# Patient Record
Sex: MALE | Race: WHITE | NOT HISPANIC OR LATINO | Employment: FULL TIME | URBAN - METROPOLITAN AREA
[De-identification: names, ages, dates, MRNs, and addresses within clinical notes are randomized per-mention and may not be internally consistent; named-entity substitution may affect disease eponyms.]

---

## 2023-09-08 PROBLEM — E78.00 HYPERCHOLESTEROLEMIA: Status: ACTIVE | Noted: 2023-09-08

## 2023-09-08 PROBLEM — I10 ESSENTIAL HYPERTENSION: Status: ACTIVE | Noted: 2023-09-08

## 2023-09-08 PROBLEM — H33.20 RETINAL DETACHMENT: Status: ACTIVE | Noted: 2023-09-08

## 2023-09-08 PROBLEM — Z99.2 DEPENDENCE ON RENAL DIALYSIS (CMS-HCC): Status: ACTIVE | Noted: 2023-09-08

## 2023-09-08 PROBLEM — E11.9 DIABETES MELLITUS (MULTI): Status: ACTIVE | Noted: 2023-09-08

## 2023-09-08 PROBLEM — R06.00 DYSPNEA: Status: ACTIVE | Noted: 2023-09-08

## 2023-09-08 PROBLEM — N18.6 END STAGE RENAL DISEASE (MULTI): Status: ACTIVE | Noted: 2023-09-08

## 2023-09-08 PROBLEM — I25.10 ATHEROSCLEROTIC HEART DISEASE OF NATIVE CORONARY ARTERY WITHOUT ANGINA PECTORIS: Status: ACTIVE | Noted: 2023-09-08

## 2023-09-08 PROBLEM — E11.3593 PROLIFERATIVE DIABETIC RETINOPATHY OF BOTH EYES ASSOCIATED WITH TYPE 2 DIABETES MELLITUS (MULTI): Status: ACTIVE | Noted: 2023-09-08

## 2023-09-08 PROBLEM — R73.09 BLOOD GLUCOSE ABNORMAL: Status: ACTIVE | Noted: 2023-09-08

## 2023-09-08 PROBLEM — I50.9 CONGESTIVE HEART FAILURE (MULTI): Status: ACTIVE | Noted: 2023-09-08

## 2023-09-08 PROBLEM — N19 RENAL FAILURE: Status: ACTIVE | Noted: 2023-09-08

## 2023-09-08 PROBLEM — M54.12 CERVICAL RADICULOPATHY: Status: ACTIVE | Noted: 2023-09-08

## 2023-09-08 PROBLEM — D64.9 ANEMIA: Status: ACTIVE | Noted: 2023-09-08

## 2023-09-08 PROBLEM — E11.9 TYPE 2 DIABETES MELLITUS WITHOUT COMPLICATION (MULTI): Status: ACTIVE | Noted: 2023-09-08

## 2023-09-08 PROBLEM — A41.9 SEPSIS (MULTI): Status: ACTIVE | Noted: 2023-09-08

## 2023-09-08 PROBLEM — E11.40 DIABETIC NEUROPATHY (MULTI): Status: ACTIVE | Noted: 2023-09-08

## 2023-09-08 PROBLEM — E86.0 DEHYDRATION: Status: ACTIVE | Noted: 2023-09-08

## 2023-09-08 RX ORDER — CARVEDILOL 25 MG/1
1 TABLET ORAL
COMMUNITY

## 2023-09-08 RX ORDER — ASPIRIN 81 MG/1
1 TABLET ORAL DAILY
COMMUNITY
End: 2024-02-06

## 2023-09-08 RX ORDER — FLUTICASONE PROPIONATE 50 MCG
1 SPRAY, SUSPENSION (ML) NASAL DAILY
COMMUNITY
Start: 2023-01-27 | End: 2024-02-06

## 2023-09-08 RX ORDER — ATORVASTATIN CALCIUM 20 MG/1
1 TABLET, FILM COATED ORAL DAILY
COMMUNITY
End: 2024-02-06

## 2023-09-08 RX ORDER — SAXAGLIPTIN AND METFORMIN HYDROCHLORIDE 1000; 2.5 MG/1; MG/1
1 TABLET, FILM COATED, EXTENDED RELEASE ORAL EVERY 12 HOURS
COMMUNITY
Start: 2013-07-12 | End: 2024-02-06

## 2023-09-08 RX ORDER — AMLODIPINE BESYLATE 10 MG/1
1 TABLET ORAL DAILY
COMMUNITY

## 2023-09-08 RX ORDER — LANCETS 33 GAUGE
EACH MISCELLANEOUS EVERY 6 HOURS
COMMUNITY
Start: 2021-06-17 | End: 2024-02-06

## 2023-09-08 RX ORDER — HYDRALAZINE HYDROCHLORIDE 100 MG/1
1 TABLET, FILM COATED ORAL 3 TIMES DAILY
COMMUNITY

## 2023-09-08 RX ORDER — INSULIN DETEMIR 100 [IU]/ML
40 INJECTION, SOLUTION SUBCUTANEOUS
COMMUNITY
Start: 2014-10-13 | End: 2024-02-06

## 2023-09-08 RX ORDER — LISINOPRIL 10 MG/1
1 TABLET ORAL DAILY
COMMUNITY
Start: 2013-11-14 | End: 2024-02-06

## 2023-09-08 RX ORDER — LOSARTAN POTASSIUM 100 MG/1
1 TABLET ORAL DAILY
COMMUNITY

## 2023-09-08 RX ORDER — DICLOFENAC SODIUM 10 MG/G
1 GEL TOPICAL EVERY 6 HOURS
COMMUNITY
Start: 2023-03-16 | End: 2024-02-06

## 2023-10-05 ENCOUNTER — TREATMENT (OUTPATIENT)
Dept: PHYSICAL THERAPY | Facility: CLINIC | Age: 53
End: 2023-10-05
Payer: COMMERCIAL

## 2023-10-05 DIAGNOSIS — G95.9 DISEASE OF SPINAL CORD (MULTI): Primary | ICD-10-CM

## 2023-10-05 DIAGNOSIS — G95.9 DISEASE OF SPINAL CORD, UNSPECIFIED (MULTI): ICD-10-CM

## 2023-10-05 PROCEDURE — 97530 THERAPEUTIC ACTIVITIES: CPT | Mod: GP | Performed by: PHYSICAL MEDICINE & REHABILITATION

## 2023-10-05 PROCEDURE — 97110 THERAPEUTIC EXERCISES: CPT | Mod: GP | Performed by: PHYSICAL MEDICINE & REHABILITATION

## 2023-10-05 ASSESSMENT — PAIN SCALES - GENERAL: PAINLEVEL_OUTOF10: 0 - NO PAIN

## 2023-10-05 NOTE — PROGRESS NOTES
Physical Therapy Treatment    Patient Name: Abner Fabian  MRN: [id number]  Today's Date: 10/5/2023     Current Problem  1. Disease of spinal cord (CMS/HCC)        2. Disease of spinal cord, unspecified (CMS/HCC)  Follow Up In Physical Therapy          Subjective   General  General Comment: Patient reports good progress since initiating physical therapy. He reports no new complaints this visit,and feels ready to begin working on his HEP after today.  Precautions  Precautions  Precautions Comment: None  Pain  Pain Score: 0 - No pain    Objective     Outcome Measures:  Other Measures  Neck Disability Index: (P) 2% (Improved from 10%)    Treatments:  Therapeutic Exercise  Therapeutic Exercise Activity 1: SciFit: L3 x3' fwd and back  Therapeutic Exercise Activity 2: Pec Stretch Low/High x1' each  Therapeutic Exercise Activity 3: Therabar twists x20 fwd and back  Therapeutic Exercise Activity 4: Forearm pronation x20 w towel  and 5#  Therapeutic Exercise Activity 5: Forearm supination x20 w towel  and 5#  Therapeutic Exercise Activity 6: L wrist extension: x20 w 8#  Therapeutic Exercise Activity 7: L wrist flexion:x20 w 8#  Therapeutic Exercise Activity 8: Seated Rows at Matrix: 3x10 w 25#  Therapeutic Exercise Activity 9: Shoulder Ext: 3x10 w 17.5#  Therapeutic Exercise Activity 10: Standing ER: 3x10 w 5#  Therapeutic Exercise Activity 11: Ball on the wall walks: x20 into flexion  Therapeutic Exercise Activity 12: Chest Flyes w cervical stabilization: x20 w 5#  Therapeutic Exercise Activity 13: Supine shoulder flexion x20 w 9# bar  Therapeutic Exercise Activity 14: Supine SA Punch: x20 w 5#    Therapeutic Activity  Therapeutic Activity 1: Shelf Taps: x20 w 8# med ball  Therapeutic Activity 2: Farmer Walks: 4x50' down and back w towel  and 5# KBs    Assessment:  PT Assessment  Assessment Comment: Patient demonstrates great progress since initiating physical therapy. He has met his therapy related goals, and  has elected to be discharged following today's session.    Plan:  OP PT Plan  PT Plan: No Additional PT interventions required at this time    Goals:   1. Patient will achieve an NDI score of 5% or less in order to improve overall quality of life in 8 weeks (Goal Met)  2. Patient to display 80 degrees of bilateral cervical rotation AROM in order to allow for improved driving ability in 6 weeks (Not Met)  3. Patient to improve scapular strength to 5-/5 in order to reduce compensatory guarding in upper trapezius and greater functional use of upper extremities in 8 weeks. (Goal Met)  4. Patient to be able to perform all daily/work related tasks with pain no greater than 2/10 in 5 weeks (Goal Met)  5. Patient will be independent with HEP in 3 visits in order to allow for improved function with home and community tasks. (Goal Met)     Darian Jacques, PT

## 2023-11-02 ENCOUNTER — OFFICE VISIT (OUTPATIENT)
Dept: OPHTHALMOLOGY | Facility: CLINIC | Age: 53
End: 2023-11-02
Payer: MEDICARE

## 2023-11-02 DIAGNOSIS — H49.01 CN III PALSY, RIGHT: ICD-10-CM

## 2023-11-02 DIAGNOSIS — H05.89 ORBITAL MASS: Primary | ICD-10-CM

## 2023-11-02 PROCEDURE — 92083 EXTENDED VISUAL FIELD XM: CPT | Performed by: PSYCHIATRY & NEUROLOGY

## 2023-11-02 PROCEDURE — 92060 SENSORIMOTOR EXAMINATION: CPT | Performed by: PSYCHIATRY & NEUROLOGY

## 2023-11-02 PROCEDURE — 99215 OFFICE O/P EST HI 40 MIN: CPT | Performed by: PSYCHIATRY & NEUROLOGY

## 2023-11-02 PROCEDURE — 92133 CPTRZD OPH DX IMG PST SGM ON: CPT | Performed by: PSYCHIATRY & NEUROLOGY

## 2023-11-02 ASSESSMENT — ENCOUNTER SYMPTOMS
ALLERGIC/IMMUNOLOGIC NEGATIVE: 0
HEMATOLOGIC/LYMPHATIC NEGATIVE: 0
RESPIRATORY NEGATIVE: 0
CONSTITUTIONAL NEGATIVE: 0
CARDIOVASCULAR NEGATIVE: 0
MUSCULOSKELETAL NEGATIVE: 0
NEUROLOGICAL NEGATIVE: 0
GASTROINTESTINAL NEGATIVE: 0
ENDOCRINE NEGATIVE: 0
EYES NEGATIVE: 1
PSYCHIATRIC NEGATIVE: 0

## 2023-11-02 ASSESSMENT — CONF VISUAL FIELD
OS_NORMAL: 1
OD_INFERIOR_NASAL_RESTRICTION: 0
OS_INFERIOR_TEMPORAL_RESTRICTION: 0
OD_NORMAL: 1
OS_SUPERIOR_NASAL_RESTRICTION: 0
METHOD: COUNTING FINGERS
OD_SUPERIOR_NASAL_RESTRICTION: 0
OS_SUPERIOR_TEMPORAL_RESTRICTION: 0
OS_INFERIOR_NASAL_RESTRICTION: 0
OD_INFERIOR_TEMPORAL_RESTRICTION: 0
OD_SUPERIOR_TEMPORAL_RESTRICTION: 0

## 2023-11-02 ASSESSMENT — VISUAL ACUITY
OS_SC: 20/70-1
OS_PH_SC: 20/60-2
OD_PH_SC: 20/20-3
OD_SC: 20/30-2
METHOD: SNELLEN - LINEAR

## 2023-11-02 ASSESSMENT — CUP TO DISC RATIO
OD_RATIO: 0.2
OS_RATIO: 0.25

## 2023-11-02 ASSESSMENT — TONOMETRY
OD_IOP_MMHG: 15
OS_IOP_MMHG: 12
IOP_METHOD: TONOPEN

## 2023-11-02 ASSESSMENT — EXTERNAL EXAM - RIGHT EYE: OD_EXAM: NORMAL

## 2023-11-02 ASSESSMENT — EXTERNAL EXAM - LEFT EYE: OS_EXAM: NORMAL

## 2023-11-02 NOTE — PROGRESS NOTES
"Assessment and Plan    05/17/2023 CT facial bones without contrast, which I personally reviewed, shows the stable mass.  05/15/2023 MRI orbits with contrast, which I personally reviewed, shows an enhancing mass involving the right frontal bone and the superior and lateral orbital walls on the right.    05/19/2023 pathology \"RIGHT ORBITAL MASS, BIOPSIES: --CAPILLARY MALFORMATION\"    11/2/2023 OCT RNFL  (algorithm failure) & OS 79 (algorithm failure). (Cirrus)    11/2/2023 HVF 24-2 OD fovea 33, scatter MD -6.50 & OS fovea 27, scatter MD -6.73.    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    He has right hypotropia worse in superior gaze and exotropia in left gaze. 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 These findings are consistent with the tumor location. The tumor itself was benign on biopsy, leaving some possibility of recovery. I do not see evidence of optic neuropathy. I recommend surveillance for now given the potential for continued improvement.    He should continue retina care, of course, for diabetic retinopathy.    Plan    Surveillance.    Follow up in 3-4 months with stereo plates, HVF & OCT. (Dilated 11/2/2023)  "

## 2023-11-02 NOTE — LETTER
"November 2, 2023     Nicole C Beharry, MD  [address] [facility]  [facility] [address]    Patient: Abner Fabian   YOB: 1970   Date of Visit: 11/2/2023     Dear Dr. Nicole C Beharry, MD:    I am writing to share my findings regarding our shared patient Abner Fabian from his visit with me on 11/2/2023.    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    He last followed up with oculoplastic surgeon Dr. Alex Fierro 8/3/2023 with referral to me for monitoring.    He has had right ptosis for about a year with some improvement. He has diplopia only intermittently when tired. He sometimes has diplopia in left gaze when looking over to the left quickly. Vision from the right eye itself is not too bad.    He follows with retina specialist Dr. Nicole C Beharry. He reports laser treatment in the past, but none recently and no recent injections.  Last edited by Valeriy Lloyd MD PhD on 11/2/2023  3:43 PM.        Diagnoses    Abner was seen today for new patient visit.  Diagnoses and all orders for this visit:  Orbital mass (Primary)  -     OCT, Optic Nerve - OU - Both Eyes  -     Tee Visual Field - OU - Both Eyes  CN III palsy, right    Assessment and Plan    05/17/2023 CT facial bones without contrast, which I personally reviewed, shows the stable mass.  05/15/2023 MRI orbits with contrast, which I personally reviewed, shows an enhancing mass involving the right frontal bone and the superior and lateral orbital walls on the right.    05/19/2023 pathology \"RIGHT ORBITAL MASS, BIOPSIES: --CAPILLARY MALFORMATION\"    11/2/2023 OCT RNFL  (algorithm failure) & OS 79 (algorithm failure). " (Cirrus)    11/2/2023 HVF 24-2 OD fovea 33, scatter MD -6.50 & OS fovea 27, scatter MD -6.73.    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    He has right hypotropia worse in superior gaze and exotropia in left gaze. 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 These findings are consistent with the tumor location. The tumor itself was benign on biopsy, leaving some possibility of recovery. I do not see evidence of optic neuropathy. I recommend surveillance for now given the potential for continued improvement.    He should continue retina care, of course, for diabetic retinopathy.    Plan    Surveillance.    Follow up in 3-4 months with stereo plates, HVF & OCT. (Dilated 11/2/2023)      Below you will find my full examination. I appreciate the opportunity to see Abner Fabian today and to share in his care with you. Please contact me if you have questions for me regarding this visit or if I can be of assistance to another of your patients with neuro-ophthalmological problems.    Sincerely,    Valeriy Lloyd MD PhD    CC:   No Recipients      Base Eye Exam       Visual Acuity (Snellen - Linear)         Right Left    Dist sc 20/30-2 20/70-1    Dist ph sc 20/20-3 20/60-2              Tonometry (Tonopen, 2:44 PM)         Right Left    Pressure 15 12              Pupils         Dark Light Shape React APD    Right 4 3 Round Brisk None    Left 4 3 Round Brisk None              Visual Fields (Counting fingers)         Left Right     Full Full              Neuro/Psych       Oriented x3: Yes              Dilation       Both eyes: 1% Mydriacyl & 2.5% Quinton  @ 3:41 PM                   Additional Tests       Color         Right Left    Ishihara 2 2              Stereo       Fly: +    Animals: 0    Circles: 0                  Strabismus Exam         - - -3 - -  XT 6 - - 0 - -            RHypoT 20          RHypoT 6 0  -2  Ortho  0  0  XT 3               RHypoT 6      - - 0 - -  RHypoT 3 - - 0 - -                       Slit Lamp and Fundus Exam       External Exam         Right Left    External Normal Normal              Slit Lamp Exam         Right Left    Lids/Lashes ptosis MRD1 1, LF 10 MRD1 5, LF 15    Conjunctiva/Sclera White and quiet White and quiet    Cornea Clear Clear    Anterior Chamber Deep and quiet Deep and quiet    Iris Round and reactive Round and reactive    Lens Clear Clear    Anterior Vitreous Normal Normal              Fundus Exam         Right Left    Disc Normal Normal    C/D Ratio 0.2 0.25    Macula Normal Normal    Vessels Normal Normal    Periphery sueprior nasal traction with heme and barrier laser, inferior nasal heme PRP superior & inferior nasal

## 2023-11-17 ENCOUNTER — TELEPHONE (OUTPATIENT)
Dept: ENDOCRINOLOGY | Facility: CLINIC | Age: 53
End: 2023-11-17
Payer: MEDICARE

## 2023-11-17 NOTE — TELEPHONE ENCOUNTER
Abner Fabian   1970   [id number]   [phone]     Called and spoke to patient in regards to canceling and rescheduling 2/8/24 appt due to provider is not available. Appt was rescheduled for 2/6/24.

## 2024-02-03 NOTE — PROGRESS NOTES
HPI   52 yo with Diabetes 2 (+retinopathy)(dx in his 30's, had weighed nearly 260lbs), HTN, hx of MI, anemia, ESRD on dialysis 2019 (Dr. Larose) R oribital hemangioma. Last A1c-6.6%, today 7.5%.     Pt is testing sugars <1 times per day. Pt is having low sugars <1 times/week. Pt's typical blood sugars are running low 100's at different times of day. Pt is struggling following a carb controlled diet and knows reasonable carb allowances, he tries to watch portion control. Pt is able to afford their medications.           Taking januvia 25mg. Pt was taking jentaduetto and had gi effects in 2021, had taken lantus 10 units in the past and was having lows. Pt does not recall the other meds he has tried.  Have not tried glp-1RA as we don't want pt to lose excessive wt.           Taking amlodipine 10mg, carvedilol 25mg bid, losartan 100mg, hydralazine 100mg bid for htn.          Pt is not on a statin currently.                Current Outpatient Medications:     amLODIPine (Norvasc) 10 mg tablet, Take 1 tablet (10 mg) by mouth once daily., Disp: , Rfl:     blood sugar diagnostic strip, TEST FOUR TIMES DAILY, Disp: , Rfl:     carvedilol (Coreg) 25 mg tablet, Take 1 tablet (25 mg) by mouth 2 times a day with meals., Disp: , Rfl:     folic acid/vit B complex and C (SARAH-AJAY ORAL), Take 1 tablet by mouth once daily., Disp: , Rfl:     heparin sodium,porcine (heparin, porcine,) 1000 UNIT/ML injection, Heparin Sodium (Porcine) 1,000 Units/mL Systemic, Disp: , Rfl:     hydrALAZINE (Apresoline) 100 mg tablet, Take 1 tablet (100 mg) by mouth 3 times a day. with food, Disp: , Rfl:     iron sucrose (Venofer) 50 mg iron/2.5 mL solution injection, Infuse 2.5 mL (50 mg) into a venous catheter per week., Disp: , Rfl:     losartan (Cozaar) 100 mg tablet, Take 1 tablet (100 mg) by mouth once daily., Disp: , Rfl:     methoxy peg-epoetin beta (MIRCERA INJ), 200 mcg every 14 (fourteen) days., Disp: , Rfl:     prednisoLONE acetate (Pred-Forte)  1 % ophthalmic suspension, Administer 1 drop into the left eye 2 times a day., Disp: , Rfl:     SITagliptin phosphate (Januvia) 25 mg tablet, once every 24 hours., Disp: , Rfl:     timolol (Timoptic) 0.5 % ophthalmic solution, Administer 1 drop into the left eye 2 times a day., Disp: , Rfl:       Allergies as of 02/06/2024    (No Known Allergies)         Review of Systems   Cardiology: Lightheadedness-denies.  Chest pain-denies.  Leg edema-denies.  Palpitations-denies.  Respiratory: Cough-at times. Shortness of breath-denies.  Wheezing-denies.  Gastroenterology: Constipation-denies.  Diarrhea-denies.  Heartburn-denies.  Endocrinology: Cold intolerance-denies.  Heat intolerance-denies.  Sweats-denies.  Neurology: Headache-denies.  Tremor-denies.  Neuropathy in extremities + feet  Psychology: Low energy-denies.  Irritability-denies.  Sleep disturbances-denies.      /58 (BP Location: Right arm)   Pulse 52   Wt 84.4 kg (186 lb)   BMI 24.54 kg/m²       Labs:  Lab Results   Component Value Date    WBC 8.0 05/23/2023    NRBC 0.0 05/23/2023    RBC 3.14 (L) 05/23/2023    HGB 9.6 (L) 05/23/2023    HCT 28.9 (L) 05/23/2023     05/23/2023     Lab Results   Component Value Date    CALCIUM 8.8 05/23/2023    AST 18 05/12/2023    ALKPHOS 71 05/12/2023    BILITOT 1.0 05/12/2023    PROT 7.0 05/12/2023    ALBUMIN 3.3 (L) 05/23/2023    GLOB 3.2 04/01/2023    AGR 1.2 (L) 04/01/2023     05/23/2023    K 4.0 05/23/2023    CL 96 (L) 05/23/2023    CO2 28 05/23/2023    ANIONGAP 17 05/23/2023    BUN 25 (H) 05/23/2023    CREATININE 6.92 (H) 05/23/2023    UREACREAUR 4.7 (L) 04/04/2023    GLUCOSE 275 (H) 05/23/2023    ALT 13 05/12/2023    EGFR 8 04/04/2023     Lab Results   Component Value Date    CHOL 133 06/16/2021    TRIG 101 06/16/2021    HDL 34 (L) 06/16/2021    LDLCALC 79 06/16/2021     Lab Results   Component Value Date    MICROALBCREA 5,103.3 (H) 11/13/2018     Lab Results   Component Value Date    TSH 2.33 05/13/2023  "    No results found for: \"[id number]\"  Lab Results   Component Value Date    HGBA1C 7.5 (A) 02/06/2024         Physical Exam   General Appearance: pleasant, cooperative, no acute distress  HEENT: no chemosis, no proptosis, no lid lag, no lid retraction  Neck: no lymphadenopathy, no thyromegaly, no dominant thyroid nodules  Heart: no murmurs, regular rate and rhythm, S1 and S2  Lungs: no wheezes, no rhonci, no rales  Extremities: no lower extremity swelling      Assessment/Plan   1. Type 2 diabetes mellitus   -A1c ordered and reviewed  -glycemic values reviewed  -labs/notes reviewed    -needs to test daily at different times of day  -A1c likely falsely low due to anemia  -work on carb controlled diet  -next move is likely insulin  -avoiding glp-1RA due to concern for excessive wt loss    2. Essential hypertension  -at target on therapy, on HD, following with Dr. Larose    3. Hypercholesterolemia  -given dm2/hx of mi would favor ldl <55  -start atorvastatin 10mg every day, went over risk/benefits/warnings         Follow Up:  Parker Mckeon 3 months    -labs/tests/notes reviewed  -reviewed and counseled patient on medication monitoring and side effects          "

## 2024-02-06 ENCOUNTER — OFFICE VISIT (OUTPATIENT)
Dept: ENDOCRINOLOGY | Facility: CLINIC | Age: 54
End: 2024-02-06
Payer: MEDICARE

## 2024-02-06 VITALS
BODY MASS INDEX: 24.54 KG/M2 | HEART RATE: 52 BPM | WEIGHT: 186 LBS | DIASTOLIC BLOOD PRESSURE: 58 MMHG | SYSTOLIC BLOOD PRESSURE: 131 MMHG

## 2024-02-06 DIAGNOSIS — I10 ESSENTIAL HYPERTENSION: ICD-10-CM

## 2024-02-06 DIAGNOSIS — E11.9 TYPE 2 DIABETES MELLITUS WITHOUT COMPLICATION, UNSPECIFIED WHETHER LONG TERM INSULIN USE (MULTI): ICD-10-CM

## 2024-02-06 DIAGNOSIS — E78.00 HYPERCHOLESTEROLEMIA: ICD-10-CM

## 2024-02-06 DIAGNOSIS — E78.00 HYPERCHOLESTEROLEMIA: Primary | ICD-10-CM

## 2024-02-06 LAB — POC HEMOGLOBIN A1C: 7.5 % (ref 4.2–6.5)

## 2024-02-06 PROCEDURE — 4010F ACE/ARB THERAPY RXD/TAKEN: CPT | Performed by: INTERNAL MEDICINE

## 2024-02-06 PROCEDURE — 99214 OFFICE O/P EST MOD 30 MIN: CPT | Performed by: INTERNAL MEDICINE

## 2024-02-06 PROCEDURE — 83036 HEMOGLOBIN GLYCOSYLATED A1C: CPT | Performed by: INTERNAL MEDICINE

## 2024-02-06 PROCEDURE — 3075F SYST BP GE 130 - 139MM HG: CPT | Performed by: INTERNAL MEDICINE

## 2024-02-06 PROCEDURE — 3078F DIAST BP <80 MM HG: CPT | Performed by: INTERNAL MEDICINE

## 2024-02-06 PROCEDURE — 1036F TOBACCO NON-USER: CPT | Performed by: INTERNAL MEDICINE

## 2024-02-06 RX ORDER — TIMOLOL MALEATE 5 MG/ML
1 SOLUTION/ DROPS OPHTHALMIC 2 TIMES DAILY
COMMUNITY
Start: 2024-01-18

## 2024-02-06 RX ORDER — ATORVASTATIN CALCIUM 10 MG/1
10 TABLET, FILM COATED ORAL DAILY
Qty: 30 TABLET | Refills: 5 | Status: SHIPPED | OUTPATIENT
Start: 2024-02-06 | End: 2024-02-06

## 2024-02-06 RX ORDER — ATORVASTATIN CALCIUM 10 MG/1
10 TABLET, FILM COATED ORAL DAILY
Qty: 90 TABLET | Refills: 3 | Status: SHIPPED | OUTPATIENT
Start: 2024-02-06

## 2024-02-06 RX ORDER — PREDNISOLONE ACETATE 10 MG/ML
1 SUSPENSION/ DROPS OPHTHALMIC 2 TIMES DAILY
COMMUNITY
Start: 2024-01-18

## 2024-02-06 ASSESSMENT — ENCOUNTER SYMPTOMS
OCCASIONAL FEELINGS OF UNSTEADINESS: 0
LOSS OF SENSATION IN FEET: 0
DEPRESSION: 0

## 2024-02-06 ASSESSMENT — PATIENT HEALTH QUESTIONNAIRE - PHQ9
SUM OF ALL RESPONSES TO PHQ9 QUESTIONS 1 & 2: 0
2. FEELING DOWN, DEPRESSED OR HOPELESS: NOT AT ALL
1. LITTLE INTEREST OR PLEASURE IN DOING THINGS: NOT AT ALL

## 2024-02-06 ASSESSMENT — PAIN SCALES - GENERAL: PAINLEVEL: 0-NO PAIN

## 2024-02-26 ENCOUNTER — APPOINTMENT (OUTPATIENT)
Dept: OPHTHALMOLOGY | Facility: CLINIC | Age: 54
End: 2024-02-26
Payer: MEDICARE

## 2024-02-27 ENCOUNTER — OFFICE VISIT (OUTPATIENT)
Dept: OPHTHALMOLOGY | Facility: CLINIC | Age: 54
End: 2024-02-27
Payer: MEDICARE

## 2024-02-27 DIAGNOSIS — H49.01 CN III PALSY, RIGHT: Primary | ICD-10-CM

## 2024-02-27 DIAGNOSIS — H05.89 ORBITAL MASS: ICD-10-CM

## 2024-02-27 PROCEDURE — 92083 EXTENDED VISUAL FIELD XM: CPT | Performed by: PSYCHIATRY & NEUROLOGY

## 2024-02-27 PROCEDURE — 92060 SENSORIMOTOR EXAMINATION: CPT | Performed by: PSYCHIATRY & NEUROLOGY

## 2024-02-27 PROCEDURE — 99213 OFFICE O/P EST LOW 20 MIN: CPT | Performed by: PSYCHIATRY & NEUROLOGY

## 2024-02-27 PROCEDURE — 92133 CPTRZD OPH DX IMG PST SGM ON: CPT | Performed by: PSYCHIATRY & NEUROLOGY

## 2024-02-27 ASSESSMENT — ENCOUNTER SYMPTOMS
CARDIOVASCULAR NEGATIVE: 0
PSYCHIATRIC NEGATIVE: 0
RESPIRATORY NEGATIVE: 0
MUSCULOSKELETAL NEGATIVE: 0
HEMATOLOGIC/LYMPHATIC NEGATIVE: 0
GASTROINTESTINAL NEGATIVE: 0
CONSTITUTIONAL NEGATIVE: 0
ALLERGIC/IMMUNOLOGIC NEGATIVE: 0
NEUROLOGICAL NEGATIVE: 0
ENDOCRINE NEGATIVE: 0
EYES NEGATIVE: 1

## 2024-02-27 ASSESSMENT — CONF VISUAL FIELD
OD_NORMAL: 1
OS_SUPERIOR_TEMPORAL_RESTRICTION: 0
OD_SUPERIOR_NASAL_RESTRICTION: 0
OS_SUPERIOR_NASAL_RESTRICTION: 0
OD_INFERIOR_TEMPORAL_RESTRICTION: 0
OD_INFERIOR_NASAL_RESTRICTION: 0
OS_INFERIOR_NASAL_RESTRICTION: 0
OD_SUPERIOR_TEMPORAL_RESTRICTION: 0
OS_INFERIOR_TEMPORAL_RESTRICTION: 0
OS_NORMAL: 1

## 2024-02-27 ASSESSMENT — CUP TO DISC RATIO
OD_RATIO: 0.2
OS_RATIO: 0.25

## 2024-02-27 ASSESSMENT — TONOMETRY
OD_IOP_MMHG: 31
IOP_METHOD: GOLDMANN APPLANATION
OS_IOP_MMHG: 17

## 2024-02-27 ASSESSMENT — VISUAL ACUITY
OS_SC: 20/40
OD_SC: 20/30+1
METHOD: SNELLEN - LINEAR

## 2024-02-27 ASSESSMENT — LEVATOR FUNCTION
OS_LEVATOR: 15
OD_LEVATOR: 10

## 2024-02-27 ASSESSMENT — EXTERNAL EXAM - RIGHT EYE: OD_EXAM: NORMAL

## 2024-02-27 ASSESSMENT — MARGIN REFLEX DISTANCE
OS_MRD1: 3
OD_MRD1: 1

## 2024-02-27 ASSESSMENT — EXTERNAL EXAM - LEFT EYE: OS_EXAM: NORMAL

## 2024-02-27 NOTE — LETTER
February 27, 2024     Reggie Woodson MD  [address] [facility] [address]    Patient: Abner Fabian   YOB: 1970   Date of Visit: 2/27/2024     Dear Dr. Reggie Woodson MD:    I am writing to share my findings regarding our shared patient Abner Fabian from his visit with me on 2/27/2024.    HPI    53yom 3m f/up.  Pt states vision is worse since last exam pt states had sx OS for macular hole with gas bubble at [facility] by Dr. Cohen 1/17/24,  note pt is also on dialysis 3x's week for kidney failure, denies pain ou, denies flashes ou, denies floaters ou. +diab, +HTN, -heart, +kidney failure  Pt taking:  Predi bid os, Timolol  bid os.  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    He reports vision is affected now by the gas bubble. He closes one eye much of the time. He can see single if he positions his head properly, but if he moves his head much, the bubble obscures his vision.  Last edited by Valeriy Lloyd MD PhD on 2/27/2024 11:37 AM.        Diagnoses    Abner was seen today for follow-up.  Diagnoses and all orders for this visit:  CN III palsy, right (Primary)  -     OCT, Optic Nerve - OU - Both Eyes  -     Tee Visual Field - OU - Both Eyes  Orbital mass  -     OCT, Optic Nerve - OU - Both Eyes  -     Tee Visual Field - OU - Both Eyes    Assessment and Plan    05/17/2023 CT facial bones without contrast, which I personally reviewed previously, shows the stable mass.  05/15/2023 MRI orbits with contrast, which I personally reviewed previously, shows an enhancing mass involving the right  "frontal bone and the superior and lateral orbital walls on the right.    05/19/2023 pathology \"RIGHT ORBITAL MASS, BIOPSIES: --CAPILLARY MALFORMATION\"    02/27/2024 OCT RNFL OD 94 (segmentation failure) & OS 89 (algorithm failure). (Cirrus)  11/2/2023 OCT RNFL  (algorithm failure) & OS 79 (algorithm failure). (Cirrus)    02/27/2024 HVF 24-2 OD fovea 32, FL 0/0, FP 4%, FN 0%, general reduction MD -12.92 & OS fovea 26, FL 0/0, FP 1%, FN 16%, scatter MD -8.05.  11/2/2023 HVF 24-2 OD fovea 33, scatter MD -6.50 & OS fovea 27, scatter MD -6.73.    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    Findings remain similar to before right right hypotropia in superior gaze and exotropia in left gaze along with ptosis consistent with right cranial nerve (CN) III palsy without significant interval change. I do not see evidence of optic neuropathy. The intraocular pressure is high today after recent retina surgery, and I will share this update with his retina surgeon Dr. Reggie Woodson.    He has ongoing retina care, of course, for diabetic retinopathy.    Plan    Surveillance.    Follow up in 6 months with stereo plates, HVF & OCT. (Dilated 11/2/2023)      Below you will find my full examination. I appreciate the opportunity to see Abner Fabian today and to share in his care with you. Please contact me if you have questions for me regarding this visit or if I can be of assistance to another of your patients with neuro-ophthalmological problems.    Sincerely,    Valeriy Lloyd MD PhD    CC:   No Recipients      Base Eye Exam       Visual Acuity (Snellen - Linear)         Right " Left    Dist sc 20/30+1 20/40              Tonometry (Goldmann Applanation, 10:49 AM)         Right Left    Pressure 31 17              Pupils         Dark Light Shape React APD    Right 4 3 Round Brisk None    Left 4 3 Round Brisk None              Visual Fields         Left Right     Full Full              Extraocular Movement    Ptosis OS (note: s/p mac hole repair with gas os 1/17/24)             Neuro/Psych       Oriented x3: Yes    Mood/Affect: Normal                  Additional Tests       Color         Right Left    Ishihara 4/11 1/11              Stereo       Fly: -    Animals: 0    Circles: 0                  Strabismus Exam       Reading #1   (Edited by: Valeriy Lloyd MD PhD)      Method: [place]well distance witihout glasses      Distance Near Near +3DS N Bifocals                            Reading #2   (Edited by: Valeriy Lloyd MD PhD)      Method: [place]well distance without glasses      Distance Near Near +3DS N Bifocals                      - - -3 - -  XT 8 - - 0 - -            RHypoT 20          RHypoT 3 0  -1  Ortho  0  0  XT 6                     - - 0 - -  Ortho  - - 0 - -                               Slit Lamp and Fundus Exam       External Exam         Right Left    External Normal Normal    MRD1 1 mm 3 mm    Levator 10 mm 15 mm              Slit Lamp Exam         Right Left    Lids/Lashes See extended examination See extended examination    Conjunctiva/Sclera White and quiet White and quiet    Cornea Scar Scar    Anterior Chamber Deep and quiet Deep and quiet    Iris Round and reactive Round and reactive    Lens Posterior chamber intraocular lens Posterior chamber intraocular lens    Anterior Vitreous Normal Normal              Fundus Exam         Right Left    Disc Normal Normal    C/D Ratio 0.2 0.25    Macula Normal Normal    Vessels Normal Normal    Periphery superior Nasal traction with heme and barrier laser, inferior Nasal heme PRP superior & inferior nasal

## 2024-02-27 NOTE — PROGRESS NOTES
"Assessment and Plan    05/17/2023 CT facial bones without contrast, which I personally reviewed previously, shows the stable mass.  05/15/2023 MRI orbits with contrast, which I personally reviewed previously, shows an enhancing mass involving the right frontal bone and the superior and lateral orbital walls on the right.    05/19/2023 pathology \"RIGHT ORBITAL MASS, BIOPSIES: --CAPILLARY MALFORMATION\"    02/27/2024 OCT RNFL OD 94 (segmentation failure) & OS 89 (algorithm failure). (Cirrus)  11/2/2023 OCT RNFL  (algorithm failure) & OS 79 (algorithm failure). (Cirrus)    02/27/2024 HVF 24-2 OD fovea 32, FL 0/0, FP 4%, FN 0%, general reduction MD -12.92 & OS fovea 26, FL 0/0, FP 1%, FN 16%, scatter MD -8.05.  11/2/2023 HVF 24-2 OD fovea 33, scatter MD -6.50 & OS fovea 27, scatter MD -6.73.    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    Findings remain similar to before right right hypotropia in superior gaze and exotropia in left gaze along with ptosis consistent with right cranial nerve (CN) III palsy without significant interval change. I do not see evidence of optic neuropathy. The intraocular pressure is high today after recent retina surgery, and I will share this update with his retina surgeon Dr. Reggie Woodson.    He has ongoing retina care, of course, for diabetic retinopathy.    Plan    Surveillance.    Follow up in 6 months with stereo plates, HVF & OCT. (Dilated 11/2/2023)  "

## 2024-05-03 ENCOUNTER — LAB (OUTPATIENT)
Dept: LAB | Facility: LAB | Age: 54
End: 2024-05-03
Payer: MEDICARE

## 2024-05-03 DIAGNOSIS — E78.00 HYPERCHOLESTEROLEMIA: ICD-10-CM

## 2024-05-03 LAB
CHOLEST SERPL-MCNC: 112 MG/DL (ref 0–199)
CHOLESTEROL/HDL RATIO: 3.5
HDLC SERPL-MCNC: 31.6 MG/DL
LDLC SERPL CALC-MCNC: 66 MG/DL
NON HDL CHOLESTEROL: 80 MG/DL (ref 0–149)
TRIGL SERPL-MCNC: 74 MG/DL (ref 0–149)
VLDL: 15 MG/DL (ref 0–40)

## 2024-05-03 PROCEDURE — 36415 COLL VENOUS BLD VENIPUNCTURE: CPT

## 2024-05-03 PROCEDURE — 80061 LIPID PANEL: CPT

## 2024-05-07 ENCOUNTER — CLINICAL SUPPORT (OUTPATIENT)
Dept: ENDOCRINOLOGY | Facility: CLINIC | Age: 54
End: 2024-05-07
Payer: MEDICARE

## 2024-05-07 VITALS
WEIGHT: 188 LBS | HEART RATE: 56 BPM | BODY MASS INDEX: 24.8 KG/M2 | DIASTOLIC BLOOD PRESSURE: 66 MMHG | SYSTOLIC BLOOD PRESSURE: 160 MMHG

## 2024-05-07 DIAGNOSIS — E11.65 TYPE 2 DIABETES MELLITUS WITH HYPERGLYCEMIA, UNSPECIFIED WHETHER LONG TERM INSULIN USE (MULTI): ICD-10-CM

## 2024-05-07 DIAGNOSIS — E11.65 TYPE 2 DIABETES MELLITUS WITH HYPERGLYCEMIA, UNSPECIFIED WHETHER LONG TERM INSULIN USE (MULTI): Primary | ICD-10-CM

## 2024-05-07 DIAGNOSIS — E78.00 HYPERCHOLESTEROLEMIA: ICD-10-CM

## 2024-05-07 DIAGNOSIS — I10 ESSENTIAL HYPERTENSION: ICD-10-CM

## 2024-05-07 LAB — POC HEMOGLOBIN A1C: 7.5 % (ref 4.2–6.5)

## 2024-05-07 PROCEDURE — 99213 OFFICE O/P EST LOW 20 MIN: CPT | Performed by: INTERNAL MEDICINE

## 2024-05-07 PROCEDURE — 83036 HEMOGLOBIN GLYCOSYLATED A1C: CPT | Performed by: INTERNAL MEDICINE

## 2024-05-07 RX ORDER — BLOOD-GLUCOSE SENSOR
1 EACH MISCELLANEOUS
Qty: 2 EACH | Refills: 5 | Status: SHIPPED | OUTPATIENT
Start: 2024-05-07 | End: 2024-05-10 | Stop reason: SDUPTHER

## 2024-05-07 RX ORDER — BLOOD-GLUCOSE SENSOR
1 EACH MISCELLANEOUS
Qty: 2 EACH | Refills: 5 | Status: SHIPPED | OUTPATIENT
Start: 2024-05-07 | End: 2024-05-07 | Stop reason: SDUPTHER

## 2024-05-07 NOTE — PATIENT INSTRUCTIONS
Check blood sugar on the Florinda ciaran often - upon waking, before/after meals, bedtime, etc.   Follow readings very closely, learning which meals/snacks cause higher blood sugars  - will send prescription to your [facility] should you wish to purchase further sensors upon completion 14 days worth of sample sensor.  Should cost no more than $75 x2 sensors

## 2024-05-07 NOTE — PROGRESS NOTES
HPI     55 yo with Diabetes 2 (+retinopathy)(dx in his 30's, had weighed nearly 260lbs), HTN, hx of MI, anemia, ESRD on dialysis 2019 (Dr. Larose) R oribital hemangioma. Last A1c-6.6%, today 7.5%.     Pt is testing sugars <1 times per day. Pt is having low sugars <1 times/week. Pt's typical blood sugars are running mid 100's at different times of day. Pt is struggling following a carb controlled diet and knows reasonable carb allowances, he tries to watch portion control. Pt is able to afford their medications.           Taking januvia 25mg daily.   Pt was taking jentaduetto and had gi effects in 2021, had taken lantus 10 units in the past and was having lows. Pt does not recall the other meds he has tried.    Have not tried glp-1RA as we don't want pt to lose excessive wt.          Taking amlodipine 10mg, carvedilol 25mg bid, losartan 100mg, hydralazine 100mg bid for htn.          Taking Atorvastatin 10mg daily added last visit,  tolerating.         Current Outpatient Medications:     amLODIPine (Norvasc) 10 mg tablet, Take 1 tablet (10 mg) by mouth once daily., Disp: , Rfl:     atorvastatin (Lipitor) 10 mg tablet, TAKE 1 TABLET(10 MG) BY MOUTH EVERY DAY, Disp: 90 tablet, Rfl: 3    blood sugar diagnostic strip, TEST FOUR TIMES DAILY, Disp: , Rfl:     carvedilol (Coreg) 25 mg tablet, Take 1 tablet (25 mg) by mouth 2 times a day with meals., Disp: , Rfl:     folic acid/vit B complex and C (SARAH-AJAY ORAL), Take 1 tablet by mouth once daily., Disp: , Rfl:     hydrALAZINE (Apresoline) 100 mg tablet, Take 1 tablet (100 mg) by mouth 3 times a day. with food, Disp: , Rfl:     iron sucrose (Venofer) 50 mg iron/2.5 mL solution injection, Infuse 2.5 mL (50 mg) into a venous catheter per week., Disp: , Rfl:     losartan (Cozaar) 100 mg tablet, Take 1 tablet (100 mg) by mouth once daily., Disp: , Rfl:     methoxy peg-epoetin beta (MIRCERA INJ), 200 mcg every 14 (fourteen) days., Disp: , Rfl:     prednisoLONE acetate  "(Pred-Forte) 1 % ophthalmic suspension, Administer 1 drop into the left eye 2 times a day., Disp: , Rfl:     SITagliptin phosphate (Januvia) 25 mg tablet, Take 1 tablet (25 mg) by mouth once daily., Disp: 90 tablet, Rfl: 1    timolol (Timoptic) 0.5 % ophthalmic solution, Administer 1 drop into the left eye 2 times a day., Disp: , Rfl:     Allergies as of 05/07/2024    (No Known Allergies)       /66   Pulse 56   Wt 85.3 kg (188 lb)   BMI 24.80 kg/m²     Labs:   Lab Results   Component Value Date    WBC 8.0 05/23/2023    NRBC 0.0 05/23/2023    RBC 3.14 (L) 05/23/2023    HGB 9.6 (L) 05/23/2023    HCT 28.9 (L) 05/23/2023     05/23/2023     Lab Results   Component Value Date    CALCIUM 8.8 05/23/2023    AST 18 05/12/2023    ALKPHOS 71 05/12/2023    BILITOT 1.0 05/12/2023    PROT 7.0 05/12/2023    ALBUMIN 3.3 (L) 05/23/2023    GLOB 3.2 04/01/2023    AGR 1.2 (L) 04/01/2023     05/23/2023    K 4.0 05/23/2023    CL 96 (L) 05/23/2023    CO2 28 05/23/2023    ANIONGAP 17 05/23/2023    BUN 25 (H) 05/23/2023    CREATININE 6.92 (H) 05/23/2023    UREACREAUR 4.7 (L) 04/04/2023    GLUCOSE 275 (H) 05/23/2023    ALT 13 05/12/2023    EGFR 8 04/04/2023     Lab Results   Component Value Date    CHOL 112 05/03/2024    TRIG 74 05/03/2024    HDL 31.6 05/03/2024    LDLCALC 66 05/03/2024     Lab Results   Component Value Date    MICROALBCREA 5,103.3 (H) 11/13/2018     Lab Results   Component Value Date    TSH 2.33 05/13/2023     No results found for: \"[id number]\"  Lab Results   Component Value Date    HGBA1C 7.5 (A) 05/07/2024         Assessment/Plan   1. Type 2 diabetes mellitus with hyperglycemia, unspecified whether long term insulin use (Multi)    -A1c ordered and reviewed  -labs/notes reviewed    -A1c likely falsely low due to anemia  -needs to test daily at different times of day or use cgm     Set up with [employer] 3 sample with smartphone ciaran today during visit, pt may consider paying cash price for further sensors " moving forward     -work on carb controlled diet  -next move is likely insulin  -avoiding glp-1RA due to concern for excessive wt loss    2. Essential hypertension  -on HD, following with Dr. Larose  -variable based on timing of meds and dialysis    3. Hypercholesterolemia  -atorvastatin 10mg added last visit,  tolerating   -reviewed labs       Follow up: 4mon BB    -labs/tests/notes reviewed  -reviewed and counseled patient on medication monitoring and side effects    Treatment and plan discussed with Dr. Miguel.  GIANNA Villalpando, PharmD, BC-ADM, [facility].

## 2024-05-07 NOTE — PROGRESS NOTES
I, Dr Doug Miguel, have reviewed this progress note, medication list, vital signs, any pertinent lab values, and any CGM data if present with the [facility] and  face to face during this visit today. This note reflects the treatment plan that was made under my direction after reviewing the above mentioned elements while face to face with the patient and CDE.  I personally answered and addressed any questions and concerns the patient had during the visit today.  The CDE entered the data in this note under my direction and I personally reviewed it, signed any lab or medication orders that I instructed to be completed. I am the billing provider for this visit and the level of service was determined by my involvement in the Medical Decision Making Component of this visit while face to face with the patient.    Electronically signed by Doug Miguel MD on 5/7/24 at 9:29 AM.

## 2024-05-10 DIAGNOSIS — E11.65 TYPE 2 DIABETES MELLITUS WITH HYPERGLYCEMIA, UNSPECIFIED WHETHER LONG TERM INSULIN USE (MULTI): ICD-10-CM

## 2024-05-10 PROCEDURE — RXMED WILLOW AMBULATORY MEDICATION CHARGE

## 2024-05-10 RX ORDER — BLOOD-GLUCOSE SENSOR
1 EACH MISCELLANEOUS
Qty: 2 EACH | Refills: 5 | Status: SHIPPED | OUTPATIENT
Start: 2024-05-10

## 2024-05-20 ENCOUNTER — PHARMACY VISIT (OUTPATIENT)
Dept: PHARMACY | Facility: CLINIC | Age: 54
End: 2024-05-20
Payer: MEDICARE

## 2024-07-22 ENCOUNTER — OFFICE VISIT (OUTPATIENT)
Dept: PRIMARY CARE | Facility: CLINIC | Age: 54
End: 2024-07-22
Payer: MEDICARE

## 2024-07-22 VITALS
TEMPERATURE: 97.7 F | DIASTOLIC BLOOD PRESSURE: 68 MMHG | HEIGHT: 73 IN | WEIGHT: 186 LBS | SYSTOLIC BLOOD PRESSURE: 148 MMHG | BODY MASS INDEX: 24.65 KG/M2 | HEART RATE: 57 BPM | OXYGEN SATURATION: 98 %

## 2024-07-22 DIAGNOSIS — Z00.00 ENCOUNTER FOR INITIAL ANNUAL WELLNESS VISIT IN MEDICARE PATIENT: Primary | ICD-10-CM

## 2024-07-22 DIAGNOSIS — E78.5 HYPERLIPIDEMIA, UNSPECIFIED HYPERLIPIDEMIA TYPE: ICD-10-CM

## 2024-07-22 DIAGNOSIS — Z13.29 SCREENING FOR THYROID DISORDER: ICD-10-CM

## 2024-07-22 DIAGNOSIS — Z12.83 SCREENING FOR SKIN CANCER: ICD-10-CM

## 2024-07-22 DIAGNOSIS — Z12.5 ENCOUNTER FOR PROSTATE CANCER SCREENING: ICD-10-CM

## 2024-07-22 DIAGNOSIS — H91.92 HEARING LOSS OF LEFT EAR, UNSPECIFIED HEARING LOSS TYPE: ICD-10-CM

## 2024-07-22 DIAGNOSIS — Z12.11 SCREENING FOR COLON CANCER: ICD-10-CM

## 2024-07-22 LAB
ALBUMIN SERPL-MCNC: 4.1 G/DL (ref 3.5–5)
ALP BLD-CCNC: 124 U/L (ref 35–125)
ALT SERPL-CCNC: 14 U/L (ref 5–40)
ANION GAP SERPL CALC-SCNC: 10 MMOL/L
AST SERPL-CCNC: 18 U/L (ref 5–40)
BASOPHILS # BLD AUTO: 0.06 X10*3/UL (ref 0–0.1)
BASOPHILS NFR BLD AUTO: 0.7 %
BILIRUB SERPL-MCNC: 0.6 MG/DL (ref 0.1–1.2)
BUN SERPL-MCNC: 30 MG/DL (ref 8–25)
CALCIUM SERPL-MCNC: 9.4 MG/DL (ref 8.5–10.4)
CHLORIDE SERPL-SCNC: 93 MMOL/L (ref 97–107)
CO2 SERPL-SCNC: 32 MMOL/L (ref 24–31)
CREAT SERPL-MCNC: 5 MG/DL (ref 0.4–1.6)
EGFRCR SERPLBLD CKD-EPI 2021: 13 ML/MIN/1.73M*2
EOSINOPHIL # BLD AUTO: 0.31 X10*3/UL (ref 0–0.7)
EOSINOPHIL NFR BLD AUTO: 3.8 %
ERYTHROCYTE [DISTWIDTH] IN BLOOD BY AUTOMATED COUNT: 16.2 % (ref 11.5–14.5)
GLUCOSE SERPL-MCNC: 182 MG/DL (ref 65–99)
HCT VFR BLD AUTO: 33 % (ref 41–52)
HGB BLD-MCNC: 11.2 G/DL (ref 13.5–17.5)
IMM GRANULOCYTES # BLD AUTO: 0.05 X10*3/UL (ref 0–0.7)
IMM GRANULOCYTES NFR BLD AUTO: 0.6 % (ref 0–0.9)
LYMPHOCYTES # BLD AUTO: 1.14 X10*3/UL (ref 1.2–4.8)
LYMPHOCYTES NFR BLD AUTO: 13.9 %
MCH RBC QN AUTO: 30.4 PG (ref 26–34)
MCHC RBC AUTO-ENTMCNC: 33.9 G/DL (ref 32–36)
MCV RBC AUTO: 90 FL (ref 80–100)
MONOCYTES # BLD AUTO: 0.61 X10*3/UL (ref 0.1–1)
MONOCYTES NFR BLD AUTO: 7.4 %
NEUTROPHILS # BLD AUTO: 6.03 X10*3/UL (ref 1.2–7.7)
NEUTROPHILS NFR BLD AUTO: 73.6 %
NRBC BLD-RTO: 0 /100 WBCS (ref 0–0)
PLATELET # BLD AUTO: 231 X10*3/UL (ref 150–450)
POTASSIUM SERPL-SCNC: 4.3 MMOL/L (ref 3.4–5.1)
PROT SERPL-MCNC: 7.4 G/DL (ref 5.9–7.9)
PSA SERPL-MCNC: 1.3 NG/ML
RBC # BLD AUTO: 3.68 X10*6/UL (ref 4.5–5.9)
SODIUM SERPL-SCNC: 135 MMOL/L (ref 133–145)
TSH SERPL DL<=0.05 MIU/L-ACNC: 1.3 MIU/L (ref 0.27–4.2)
WBC # BLD AUTO: 8.2 X10*3/UL (ref 4.4–11.3)

## 2024-07-22 PROCEDURE — 36415 COLL VENOUS BLD VENIPUNCTURE: CPT | Performed by: NURSE PRACTITIONER

## 2024-07-22 PROCEDURE — 80053 COMPREHEN METABOLIC PANEL: CPT | Performed by: NURSE PRACTITIONER

## 2024-07-22 PROCEDURE — 84153 ASSAY OF PSA TOTAL: CPT | Performed by: NURSE PRACTITIONER

## 2024-07-22 PROCEDURE — 3008F BODY MASS INDEX DOCD: CPT | Performed by: NURSE PRACTITIONER

## 2024-07-22 PROCEDURE — 84443 ASSAY THYROID STIM HORMONE: CPT | Performed by: NURSE PRACTITIONER

## 2024-07-22 PROCEDURE — 4010F ACE/ARB THERAPY RXD/TAKEN: CPT | Performed by: NURSE PRACTITIONER

## 2024-07-22 PROCEDURE — 85025 COMPLETE CBC W/AUTO DIFF WBC: CPT | Performed by: NURSE PRACTITIONER

## 2024-07-22 PROCEDURE — 3077F SYST BP >= 140 MM HG: CPT | Performed by: NURSE PRACTITIONER

## 2024-07-22 PROCEDURE — G0402 INITIAL PREVENTIVE EXAM: HCPCS | Performed by: NURSE PRACTITIONER

## 2024-07-22 PROCEDURE — 3078F DIAST BP <80 MM HG: CPT | Performed by: NURSE PRACTITIONER

## 2024-07-22 PROCEDURE — 3048F LDL-C <100 MG/DL: CPT | Performed by: NURSE PRACTITIONER

## 2024-07-22 ASSESSMENT — ENCOUNTER SYMPTOMS
FATIGUE: 1
LOSS OF SENSATION IN FEET: 1
DEPRESSION: 0
OCCASIONAL FEELINGS OF UNSTEADINESS: 1

## 2024-07-22 ASSESSMENT — ACTIVITIES OF DAILY LIVING (ADL)
GROCERY_SHOPPING: INDEPENDENT
BATHING: INDEPENDENT
DOING_HOUSEWORK: INDEPENDENT
DRESSING: INDEPENDENT
TAKING_MEDICATION: INDEPENDENT
MANAGING_FINANCES: INDEPENDENT

## 2024-07-22 ASSESSMENT — LIFESTYLE VARIABLES
HOW OFTEN DO YOU HAVE SIX OR MORE DRINKS ON ONE OCCASION: NEVER
HOW MANY STANDARD DRINKS CONTAINING ALCOHOL DO YOU HAVE ON A TYPICAL DAY: PATIENT DOES NOT DRINK
AUDIT-C TOTAL SCORE: 0
HOW OFTEN DO YOU HAVE A DRINK CONTAINING ALCOHOL: NEVER
SKIP TO QUESTIONS 9-10: 1

## 2024-07-22 ASSESSMENT — PAIN SCALES - GENERAL: PAINLEVEL: 0-NO PAIN

## 2024-07-22 ASSESSMENT — PATIENT HEALTH QUESTIONNAIRE - PHQ9
SUM OF ALL RESPONSES TO PHQ9 QUESTIONS 1 AND 2: 0
2. FEELING DOWN, DEPRESSED OR HOPELESS: NOT AT ALL
1. LITTLE INTEREST OR PLEASURE IN DOING THINGS: NOT AT ALL

## 2024-07-22 NOTE — PROGRESS NOTES
"Subjective   Patient ID: Abner Fabian is a 54 y.o. male who presents for Medicare Annual Wellness Visit Subsequent (Pt here for medicare physical).    Being sen at [facility] for a kidney transplant, just started process, sees dr bravo for dm seeing cardiology, at [facility] sees opth       Review of Systems   Constitutional:  Positive for fatigue.       Objective   /68   Pulse 57   Temp 36.5 °C (97.7 °F)   Ht 1.854 m (6' 1\")   Wt 84.4 kg (186 lb)   SpO2 98%   BMI 24.54 kg/m²     Physical Exam  HENT:      Right Ear: Tympanic membrane normal.      Left Ear: Tympanic membrane normal.   Eyes:      Comments: R eye lid drooping    Cardiovascular:      Rate and Rhythm: Normal rate and regular rhythm.      Pulses: Normal pulses.      Heart sounds: Normal heart sounds.   Pulmonary:      Effort: Pulmonary effort is normal.   Abdominal:      General: Bowel sounds are normal.   Skin:     Coloration: Skin is pale.      Comments: Has resolving sores and wound no infection  moles skin tags   Neurological:      General: No focal deficit present.      Mental Status: He is alert and oriented to person, place, and time.   Psychiatric:         Mood and Affect: Mood normal.         Behavior: Behavior normal.         Assessment/Plan   Problem List Items Addressed This Visit    None  Visit Diagnoses         Codes    Encounter for initial annual wellness visit in Medicare patient    -  Primary Z00.00    Hyperlipidemia, unspecified hyperlipidemia type     E78.5    Screening for colon cancer     Z12.11    Hearing loss of left ear, unspecified hearing loss type     H91.92    Encounter for prostate cancer screening     Z12.5    Screening for skin cancer     Z12.83    Screening for thyroid disorder     Z13.29               "
18

## 2024-08-27 ENCOUNTER — APPOINTMENT (OUTPATIENT)
Dept: OPHTHALMOLOGY | Facility: CLINIC | Age: 54
End: 2024-08-27
Payer: MEDICARE

## 2024-09-09 ENCOUNTER — APPOINTMENT (OUTPATIENT)
Dept: ENDOCRINOLOGY | Facility: CLINIC | Age: 54
End: 2024-09-09
Payer: MEDICARE

## 2024-09-09 ENCOUNTER — HOSPITAL ENCOUNTER (OUTPATIENT)
Dept: RADIOLOGY | Facility: EXTERNAL LOCATION | Age: 54
Discharge: HOME | End: 2024-09-09

## 2024-09-09 DIAGNOSIS — M25.561 ACUTE PAIN OF RIGHT KNEE: ICD-10-CM

## 2024-09-19 ENCOUNTER — PRE-ADMISSION TESTING (OUTPATIENT)
Dept: PREADMISSION TESTING | Facility: HOSPITAL | Age: 54
End: 2024-09-19
Payer: MEDICARE

## 2024-09-19 VITALS
TEMPERATURE: 98.6 F | SYSTOLIC BLOOD PRESSURE: 165 MMHG | WEIGHT: 192.1 LBS | DIASTOLIC BLOOD PRESSURE: 84 MMHG | BODY MASS INDEX: 25.46 KG/M2 | HEIGHT: 73 IN | OXYGEN SATURATION: 100 % | HEART RATE: 64 BPM

## 2024-09-19 PROCEDURE — 99203 OFFICE O/P NEW LOW 30 MIN: CPT

## 2024-09-19 ASSESSMENT — DUKE ACTIVITY SCORE INDEX (DASI)
CAN YOU DO HEAVY WORK AROUND THE HOUSE LIKE SCRUBBING FLOORS OR LIFTING AND MOVING HEAVY FURNITURE: YES
CAN YOU PARTICIPATE IN MODERATE RECREATIONAL ACTIVITIES LIKE GOLF, BOWLING, DANCING, DOUBLES TENNIS OR THROWING A BASEBALL OR FOOTBALL: NO
CAN YOU DO YARD WORK LIKE RAKING LEAVES, WEEDING OR PUSHING A MOWER: YES
CAN YOU CLIMB A FLIGHT OF STAIRS OR WALK UP A HILL: YES
CAN YOU HAVE SEXUAL RELATIONS: YES
CAN YOU PARTICIPATE IN STRENOUS SPORTS LIKE SWIMMING, SINGLES TENNIS, FOOTBALL, BASKETBALL, OR SKIING: NO
CAN YOU TAKE CARE OF YOURSELF (EAT, DRESS, BATHE, OR USE TOILET): YES
CAN YOU RUN A SHORT DISTANCE: NO
CAN YOU WALK A BLOCK OR TWO ON LEVEL GROUND: YES
CAN YOU DO LIGHT WORK AROUND THE HOUSE LIKE DUSTING OR WASHING DISHES: YES
CAN YOU WALK INDOORS, SUCH AS AROUND YOUR HOUSE: YES

## 2024-09-19 ASSESSMENT — ENCOUNTER SYMPTOMS
RESPIRATORY NEGATIVE: 1
CARDIOVASCULAR NEGATIVE: 1
ALLERGIC/IMMUNOLOGIC NEGATIVE: 1
EYES NEGATIVE: 1
CONSTITUTIONAL NEGATIVE: 1
PSYCHIATRIC NEGATIVE: 1
GASTROINTESTINAL NEGATIVE: 1
MUSCULOSKELETAL NEGATIVE: 1
HEMATOLOGIC/LYMPHATIC NEGATIVE: 1
NEUROLOGICAL NEGATIVE: 1
ENDOCRINE NEGATIVE: 1

## 2024-09-19 ASSESSMENT — PAIN - FUNCTIONAL ASSESSMENT: PAIN_FUNCTIONAL_ASSESSMENT: 0-10

## 2024-09-19 ASSESSMENT — PAIN SCALES - GENERAL: PAINLEVEL_OUTOF10: 0 - NO PAIN

## 2024-09-19 NOTE — CPM/PAT H&P
CPM/PAT Evaluation       Name: Abner Fabian (Abner Fabian)  /Age: 1970/54 y.o.     In-Person       Chief Complaint: Colonoscopy    HPI: Abner Fabian is a 54 year old male with a history of ESRD ( dialysis M,W,F),  T2DM, hypertension, and hyperlipidemia. He is scheduled for his first colonoscopy. He denies blood in the stool, changes in bowel habits, unintentional weight loss, diarrhea, or constipation. He states he is just having a routine screening. He denies fever, chills, nausea, vomiting, chest pain, sob, dizziness, or palpitations.     Past Medical History:   Diagnosis Date    Diabetes (Multi)     Diabetic retinopathy (Multi)     Hemangioma     Hypertension     Kidney failure     Macular hole     Macular hole OS    Orbital mass     Orbital bone capillary hemangioma mass       Past Surgical History:   Procedure Laterality Date    KNEE SURGERY  10/2016    OTHER SURGICAL HISTORY Left 2017    great toe amputation    OTHER SURGICAL HISTORY  2019    removal of permacath    OTHER SURGICAL HISTORY Left     AV fistula    RETINAL DETACHMENT SURGERY Left 2024    Macular hole repair sx OS 24 Dr. Cohen       Social History     Tobacco Use    Smoking status: Never     Passive exposure: Current    Smokeless tobacco: Never   Substance Use Topics    Alcohol use: Never     Social History     Substance and Sexual Activity   Drug Use Never         Family History   Problem Relation Name Age of Onset    Diabetes Mother      Heart disease Mother      Hypertension Mother      No Known Problems Father      Diabetes Brother      No Known Problems Brother      No Known Problems Brother      No Known Problems Daughter      No Known Problems Son      No Known Problems Son      No Known Problems Son      Diabetes Maternal Grandmother      Diabetes Maternal Grandfather      Heart disease Maternal Grandfather      Hypertension Maternal Grandfather         No Known Allergies      Current Outpatient Medications    Medication Sig Dispense Refill    amLODIPine (Norvasc) 10 mg tablet Take 1 tablet (10 mg) by mouth once daily.      atorvastatin (Lipitor) 10 mg tablet TAKE 1 TABLET(10 MG) BY MOUTH EVERY DAY (Patient taking differently: Take 2 tablets (20 mg) by mouth once daily.) 90 tablet 3    blood sugar diagnostic strip TEST FOUR TIMES DAILY      carvedilol (Coreg) 25 mg tablet Take 1 tablet (25 mg) by mouth 2 times daily (morning and late afternoon).      folic acid/vit B complex and C (SARAH-AJAY ORAL) Take 1 tablet by mouth once daily.      FreeStyle Florinda 3 Sensor device 1 each every 14 (fourteen) days. 2 each 5    hydrALAZINE (Apresoline) 100 mg tablet Take 1 tablet (100 mg) by mouth 3 times a day. with food      iron sucrose (Venofer) 50 mg iron/2.5 mL solution injection Infuse 2.5 mL (50 mg) into a venous catheter per week.      losartan (Cozaar) 100 mg tablet Take 1 tablet (100 mg) by mouth once daily.      methoxy peg-epoetin beta (MIRCERA INJ) 200 mcg every 14 (fourteen) days.      prednisoLONE acetate (Pred-Forte) 1 % ophthalmic suspension Administer 1 drop into the left eye 2 times a day.      SITagliptin phosphate (Januvia) 25 mg tablet Take 1 tablet (25 mg) by mouth once daily. 90 tablet 1    timolol (Timoptic) 0.5 % ophthalmic solution Administer 1 drop into the left eye 2 times a day.       No current facility-administered medications for this visit.       Review of Systems   Constitutional: Negative.    HENT: Negative.     Eyes: Negative.    Respiratory: Negative.     Cardiovascular: Negative.    Gastrointestinal: Negative.    Endocrine: Negative.    Genitourinary:  Positive for decreased urine volume.        ESRD   Musculoskeletal: Negative.    Skin:         Left lower arm fistula     Allergic/Immunologic: Negative.    Neurological: Negative.    Hematological: Negative.    Psychiatric/Behavioral: Negative.                Physical Exam  Vitals reviewed.   Constitutional:       Appearance: Normal appearance.  "  HENT:      Head: Normocephalic and atraumatic.      Nose: Nose normal.      Mouth/Throat:      Mouth: Mucous membranes are moist.      Pharynx: Oropharynx is clear.   Eyes:      Extraocular Movements: Extraocular movements intact.      Conjunctiva/sclera: Conjunctivae normal.   Cardiovascular:      Rate and Rhythm: Normal rate and regular rhythm.      Pulses: Normal pulses.      Heart sounds: Normal heart sounds.   Pulmonary:      Effort: Pulmonary effort is normal.      Breath sounds: Normal breath sounds.   Abdominal:      General: Bowel sounds are normal.      Palpations: Abdomen is soft.   Genitourinary:     Comments: Assessment referred to surgeon  Musculoskeletal:         General: Normal range of motion.      Cervical back: Normal range of motion and neck supple.   Skin:     General: Skin is warm and dry.   Neurological:      General: No focal deficit present.      Mental Status: He is alert and oriented to person, place, and time.   Psychiatric:         Mood and Affect: Mood normal.         Behavior: Behavior normal.         Thought Content: Thought content normal.         Judgment: Judgment normal.          PAT AIRWAY:   Airway:     Mallampati::  III    TM distance::  >3 FB    Neck ROM::  Full  normal      /84   Pulse 64   Temp 37 °C (98.6 °F) (Temporal)   Ht 1.854 m (6' 1\")   Wt 87.1 kg (192 lb 1.6 oz)   SpO2 100%   BMI 25.34 kg/m²     ASA: III  HALLIE: 4.0%  RCRI: 0.9%      DASI Risk Score    No data to display       Caprini DVT Assessment    No data to display       Modified Frailty Index    No data to display       CHADS2 Stroke Risk  Current as of 3 hours ago        N/A 3 to 100%: High Risk   2 to < 3%: Medium Risk   0 to < 2%: Low Risk     Last Change: N/A          This score determines the patient's risk of having a stroke if the patient has atrial fibrillation.        This score is not applicable to this patient. Components are not calculated.          Revised Cardiac Risk Index  "     Flowsheet Row Most Recent Value   Revised Cardiac Risk Calculator 1          Apfel Simplified Score    No data to display       Risk Analysis Index Results This Encounter    No data found in the last 1 encounters.       Stop Bang Score      Flowsheet Row Most Recent Value   Do you snore loudly? 0   Do you often feel tired or fatigued after your sleep? 0   Has anyone ever observed you stop breathing in your sleep? 0   Do you have or are you being treated for high blood pressure? 1   Recent BMI (Calculated) 25.4   Is BMI greater than 35 kg/m2? 0=No   Age older than 50 years old? 1=Yes   Is your neck circumference greater than 17 inches (Male) or 16 inches (Female)? 1   Gender - Male 1=Yes   STOP-BANG Total Score 4            Assessment and Plan:     Colonoscopy  ESRD: Left lower arm fistula. Patient had a full dialysis 9/18/24. Dialysis days M,W,F. Patient is doing work up to be placed on transplant list  Hypertension  CAD: NSTEMI 2019 -nonocclusive CAD brought on by onset of kidney failure and fluid overload. No intervention  T2DM  6. BMI: 25.34    Labs 7/22/24    Stress test 8/22/24    CONCLUSIONS:    1. SPECT Perfusion Study: Normal.    2. There is no scintigraphic evidence for inducible ischemia.    3. No evidence of scarred myocardium.    4. Left ventricle is normal in size. The left ventricle systolic   function is normal.    5. Right ventricle is normal in size. The right ventricle systolic   function is normal.    6. This is a low risk scan.             Gated Stress FBP    LVEF % 57     Echocardiogram 5/18/23  CONCLUSIONS:  1. Left ventricular systolic function is normal with a 55-60% estimated ejection fraction.  2. Spectral Doppler shows a pseudonormal pattern of left ventricular diastolic filling.  3. The left atrium is severely dilated.  4. There is moderate mitral annular calcification.  5. Moderately elevated right ventricular systolic pressure.  6. Aortic valve appears abnormal.  7. Mild aortic  valve stenosis.  8. There is moderate aortic valve cusp calcification.  9. Mild aortic valve regurgitation.  10. No valvular vegetations identified, though may consider PA for a more definitive assessment.     Cheyanne Dalal, APRN-CNP

## 2024-09-19 NOTE — PREPROCEDURE INSTRUCTIONS
Medication List            Accurate as of September 19, 2024  1:19 PM. Always use your most recent med list.                amLODIPine 10 mg tablet  Commonly known as: Norvasc  Medication Adjustments for Surgery: Take on the morning of surgery     atorvastatin 10 mg tablet  Commonly known as: Lipitor  TAKE 1 TABLET(10 MG) BY MOUTH EVERY DAY  Medication Adjustments for Surgery: Take on the morning of surgery     blood sugar diagnostic strip     carvedilol 25 mg tablet  Commonly known as: Coreg  Medication Adjustments for Surgery: Take on the morning of surgery     FreeStyle Florinda 3 Sensor device  Generic drug: blood-glucose sensor  1 each every 14 (fourteen) days.     hydrALAZINE 100 mg tablet  Commonly known as: Apresoline  Notes to patient: PATIENT IS NOT TAKING     iron sucrose 50 mg iron/2.5 mL solution injection  Commonly known as: Venofer     losartan 100 mg tablet  Commonly known as: Cozaar  Medication Adjustments for Surgery: Do Not take on the morning of surgery  Notes to patient: PATIENT IS NOT TAKING     MIRCERA INJ  Notes to patient: WITH DIALYSIS     prednisoLONE acetate 1 % ophthalmic suspension  Commonly known as: Pred-Forte     SARAH-AJAY ORAL  Medication Adjustments for Surgery: Do Not take on the morning of surgery     SITagliptin phosphate 25 mg tablet  Commonly known as: Januvia  Take 1 tablet (25 mg) by mouth once daily.  Medication Adjustments for Surgery: Take last dose 3 days before surgery     timolol 0.5 % ophthalmic solution  Commonly known as: Timoptic                              NPO Instructions:    Do not eat any food after midnight the night before your surgery/procedure.    Additional Instructions:     Day of Surgery:  Wear  comfortable loose fitting clothing  Do not use moisturizers, creams, lotions or perfume  All jewelry and valuables should be left at home          Why must I stop eating and drinking near surgery time?  With sedation, food or liquid in your stomach can enter  your lungs causing serious complications  Increases nausea and vomiting    When do I need to stop eating and drinking before my surgery?   Do not eat or drink after midnight the night before your surgery/procedure.  You may have small sips of water to take your medication.    PAT DISCHARGE INSTRUCTIONS    Please call the Same Day Surgery (SDS) Department of the hospital where your procedure will be performed after 2:00 PM the day before your surgery. If you are scheduled on a Monday, or a Tuesday following a Monday holiday, you will need to call on the last business day prior to your surgery.      [facility]  [id number] [place] [address], [id number] [phone]  Second Floor      Please let your surgeon know if:      You develop any open sores, shingles, burning or painful urination as these may increase your risk of an infection.   You no longer wish to have the surgery.   Any other personal circumstances change that may lead to the need to cancel or defer this surgery-such as being sick or getting admitted to any hospital within one week of your planned procedure.    Your contact details change, such as a change of address or phone number.    Starting now:     Please DO NOT drink alcohol or smoke for 24 hours before surgery. It is well known that quitting smoking can make a huge difference to your health and recovery from surgery. The longer you abstain from smoking, the better your chances of a healthy recovery. If you need help with quitting, call 1-800-QUIT-NOW to be connected to a trained counselor who will discuss the best methods to help you quit.     Before your surgery:    Please stop all supplements 7 days prior to surgery. Or as directed by your surgeon.   Please stop taking NSAID pain medicine such as Advil and Motrin 7 days before surgery.    If you develop any fever, cough, cold, rashes, cuts, scratches, scrapes, urinary symptoms or infection anywhere on your body  (including teeth and gums) prior to surgery, please call your surgeon’s office as soon as possible. This may require treatment to reduce the chance of cancellation on the day of surgery.    The day before your surgery:   DIET- Please follow the diet instructions at the top of your packet.   Get a good night’s rest.  Use the special soap for bathing if you have been instructed to use one.    Scheduled surgery times may change and you will be notified if this occurs - please check your personal voicemail for any updates.     On the morning of surgery:   Wear comfortable, loose fitting clothes which open in the front. Please do not wear moisturizers, creams, lotions, makeup or perfume.    Please bring with you to surgery:   Photo ID and insurance card   Current list of medicines and allergies   Pacemaker/ Defibrillator/Heart stent cards   CPAP machine and mask    Slings/ splints/ crutches   A copy of your complete advanced directive/DHPOA.    Please do NOT bring with you to surgery:   All jewelry and valuables should be left at home.   Prosthetic devices such as contact lenses, hearing aids, dentures, eyelash extensions, hairpins and body piercings must be removed prior to going in to the surgical suite.    After outpatient surgery:   A responsible adult MUST accompany you at the time of discharge and stay with you for 24 hours after your surgery. You may NOT drive yourself home after surgery.    Do not drive, operate machinery, make critical decisions or do activities that require co-ordination or balance until after a night’s sleep.   Do not drink alcoholic beverages for 24 hours.   Instructions for resuming your medications will be provided by your surgeon.    CALL YOUR DOCTOR AFTER SURGERY IF YOU HAVE:     Chills and/or a fever of 101° F or higher.    Redness, swelling, pus or drainage from your surgical wound or a bad smell from the wound.    Lightheadedness, fainting or confusion.    Persistent vomiting (throwing  up) and are not able to eat or drink for 12 hours.    Three or more loose, watery bowel movements in 24 hours (diarrhea).   Difficulty or pain while urinating( after non-urological surgery)    Pain and swelling in your legs, especially if it is only on one side.    Difficulty breathing or are breathing faster than normal.    Any new concerning symptoms.

## 2024-09-26 ENCOUNTER — ANESTHESIA (OUTPATIENT)
Dept: GASTROENTEROLOGY | Facility: HOSPITAL | Age: 54
End: 2024-09-26
Payer: MEDICARE

## 2024-09-26 ENCOUNTER — ANESTHESIA EVENT (OUTPATIENT)
Dept: GASTROENTEROLOGY | Facility: HOSPITAL | Age: 54
End: 2024-09-26
Payer: MEDICARE

## 2024-09-26 ENCOUNTER — HOSPITAL ENCOUNTER (OUTPATIENT)
Dept: GASTROENTEROLOGY | Facility: HOSPITAL | Age: 54
Discharge: HOME | End: 2024-09-26
Payer: MEDICARE

## 2024-09-26 VITALS
HEART RATE: 55 BPM | DIASTOLIC BLOOD PRESSURE: 67 MMHG | OXYGEN SATURATION: 99 % | RESPIRATION RATE: 16 BRPM | TEMPERATURE: 97 F | SYSTOLIC BLOOD PRESSURE: 149 MMHG

## 2024-09-26 DIAGNOSIS — Z12.11 SCREEN FOR COLON CANCER: ICD-10-CM

## 2024-09-26 LAB
GLUCOSE BLD MANUAL STRIP-MCNC: 221 MG/DL (ref 74–99)
POTASSIUM SERPL-SCNC: 4.5 MMOL/L (ref 3.5–5.3)

## 2024-09-26 PROCEDURE — 82947 ASSAY GLUCOSE BLOOD QUANT: CPT

## 2024-09-26 PROCEDURE — 2500000004 HC RX 250 GENERAL PHARMACY W/ HCPCS (ALT 636 FOR OP/ED): Performed by: ANESTHESIOLOGIST ASSISTANT

## 2024-09-26 PROCEDURE — 7100000009 HC PHASE TWO TIME - INITIAL BASE CHARGE

## 2024-09-26 PROCEDURE — 2500000001 HC RX 250 WO HCPCS SELF ADMINISTERED DRUGS (ALT 637 FOR MEDICARE OP): Performed by: ANESTHESIOLOGIST ASSISTANT

## 2024-09-26 PROCEDURE — A45380 PR COLONOSCOPY,BIOPSY: Performed by: STUDENT IN AN ORGANIZED HEALTH CARE EDUCATION/TRAINING PROGRAM

## 2024-09-26 PROCEDURE — 7100000002 HC RECOVERY ROOM TIME - EACH INCREMENTAL 1 MINUTE

## 2024-09-26 PROCEDURE — 3700000001 HC GENERAL ANESTHESIA TIME - INITIAL BASE CHARGE

## 2024-09-26 PROCEDURE — 36415 COLL VENOUS BLD VENIPUNCTURE: CPT | Performed by: INTERNAL MEDICINE

## 2024-09-26 PROCEDURE — 84132 ASSAY OF SERUM POTASSIUM: CPT | Performed by: INTERNAL MEDICINE

## 2024-09-26 PROCEDURE — 7100000001 HC RECOVERY ROOM TIME - INITIAL BASE CHARGE

## 2024-09-26 PROCEDURE — 45380 COLONOSCOPY AND BIOPSY: CPT | Performed by: INTERNAL MEDICINE

## 2024-09-26 PROCEDURE — 7100000010 HC PHASE TWO TIME - EACH INCREMENTAL 1 MINUTE

## 2024-09-26 PROCEDURE — 3700000002 HC GENERAL ANESTHESIA TIME - EACH INCREMENTAL 1 MINUTE

## 2024-09-26 PROCEDURE — A45380 PR COLONOSCOPY,BIOPSY: Performed by: ANESTHESIOLOGIST ASSISTANT

## 2024-09-26 RX ORDER — FENTANYL CITRATE 50 UG/ML
50 INJECTION, SOLUTION INTRAMUSCULAR; INTRAVENOUS EVERY 5 MIN PRN
Status: ACTIVE | OUTPATIENT
Start: 2024-09-26 | End: 2024-09-26

## 2024-09-26 RX ORDER — LABETALOL HYDROCHLORIDE 5 MG/ML
5 INJECTION, SOLUTION INTRAVENOUS EVERY 5 MIN PRN
Status: ACTIVE | OUTPATIENT
Start: 2024-09-26 | End: 2024-09-26

## 2024-09-26 RX ORDER — DEXTROMETHORPHAN/PSEUDOEPHED 2.5-7.5/.8
DROPS ORAL AS NEEDED
Status: DISCONTINUED | OUTPATIENT
Start: 2024-09-26 | End: 2024-09-27 | Stop reason: HOSPADM

## 2024-09-26 RX ORDER — FENTANYL CITRATE 50 UG/ML
INJECTION, SOLUTION INTRAMUSCULAR; INTRAVENOUS AS NEEDED
Status: DISCONTINUED | OUTPATIENT
Start: 2024-09-26 | End: 2024-09-26

## 2024-09-26 RX ORDER — SODIUM CHLORIDE, SODIUM LACTATE, POTASSIUM CHLORIDE, CALCIUM CHLORIDE 600; 310; 30; 20 MG/100ML; MG/100ML; MG/100ML; MG/100ML
40 INJECTION, SOLUTION INTRAVENOUS CONTINUOUS
Status: ACTIVE | OUTPATIENT
Start: 2024-09-26 | End: 2024-09-26

## 2024-09-26 RX ORDER — ALBUTEROL SULFATE 0.83 MG/ML
2.5 SOLUTION RESPIRATORY (INHALATION) EVERY 30 MIN PRN
Status: ACTIVE | OUTPATIENT
Start: 2024-09-26 | End: 2024-09-26

## 2024-09-26 RX ORDER — HYDROMORPHONE HYDROCHLORIDE 0.2 MG/ML
0.2 INJECTION INTRAMUSCULAR; INTRAVENOUS; SUBCUTANEOUS EVERY 5 MIN PRN
Status: DISPENSED | OUTPATIENT
Start: 2024-09-26 | End: 2024-09-26

## 2024-09-26 RX ORDER — PROPOFOL 10 MG/ML
INJECTION, EMULSION INTRAVENOUS AS NEEDED
Status: DISCONTINUED | OUTPATIENT
Start: 2024-09-26 | End: 2024-09-26

## 2024-09-26 RX ORDER — MIDAZOLAM HYDROCHLORIDE 1 MG/ML
INJECTION, SOLUTION INTRAMUSCULAR; INTRAVENOUS AS NEEDED
Status: DISCONTINUED | OUTPATIENT
Start: 2024-09-26 | End: 2024-09-26

## 2024-09-26 RX ORDER — IPRATROPIUM BROMIDE 0.5 MG/2.5ML
500 SOLUTION RESPIRATORY (INHALATION) EVERY 30 MIN PRN
Status: ACTIVE | OUTPATIENT
Start: 2024-09-26 | End: 2024-09-26

## 2024-09-26 RX ORDER — ONDANSETRON HYDROCHLORIDE 2 MG/ML
4 INJECTION, SOLUTION INTRAVENOUS ONCE AS NEEDED
Status: ACTIVE | OUTPATIENT
Start: 2024-09-26 | End: 2024-09-26

## 2024-09-26 SDOH — HEALTH STABILITY: MENTAL HEALTH: CURRENT SMOKER: 0

## 2024-09-26 ASSESSMENT — PAIN - FUNCTIONAL ASSESSMENT
PAIN_FUNCTIONAL_ASSESSMENT: 0-10

## 2024-09-26 ASSESSMENT — PAIN SCALES - GENERAL
PAINLEVEL_OUTOF10: 0 - NO PAIN

## 2024-09-26 NOTE — DISCHARGE INSTRUCTIONS
Instructions  Patient Instructions after a Colonoscopy, Upper GI, and ERCP      The anesthetics, sedatives or narcotics which were given to you today will be acting in your body for the next 24 hours, so you might feel a little sleepy or groggy.  This feeling should slowly wear off. Carefully read and follow the instructions.      You received sedation today:  - Do not drive or operate any machinery or power tools of any kind.   - No alcoholic beverages today, not even beer or wine.  - Do not make any important decisions or sign any legal documents.  - No over the counter medications that contain alcohol or that may cause drowsiness.  - Do not make any important decisions or sign any legal documents.     While it is common to experience mild to moderate abdominal distention, gas, or belching after your procedure, if any of these symptoms occur following discharge from the GI Lab or within one week of having your procedure, call the [facility] to be advised whether a visit to your nearest Urgent Care or Emergency Department is indicated.  Take this paper with you if you go.      - If you develop an allergic reaction to the medications that were given during your procedure such as difficulty breathing, rash, hives, severe nausea, vomiting or lightheadedness.- If you experience chest pain, shortness of breath, severe abdominal pain, fevers and chills.     -If you develop signs and symptoms of bleeding such as blood in your spit, if your stools turn black, tarry, or bloody     - If you have not urinated within 8 hours following your procedure.- If your IV site becomes painful, red, inflamed, or looks infected.     If you received a biopsy/polypectomy/sphincterotomy the following instructions apply below:     - Do not use Aspirin containing products, non-steroidal medications or anti-coagulants for one week following your procedure. (Examples of these types of medications are: Advil, Arthrotec, Aleve,  Coumadin, Ecotrin, Heparin, Ibuprofen, Indocin, Motrin, Naprosyn, Nuprin, Plavix, Vioxx, and Voltarin, or their generic forms.  This list is not all-inclusive.  Check with your physician or pharmacist before resuming medications.)      - Eat a soft diet today.  Avoid foods that are poorly digested for the next 24 hours.  These foods would include: nuts, beans, lettuce, red meats, and fried foods.       - Start with liquids and advance your diet as tolerated, gradually work up to eating solids.      - Do not have a Barium Study or Enema for one week.     Your physician recommends the additional following instructions:     Colonoscopy: Resume your previous diet, continue your present medications, a repeated colonoscopy will be determined based on pathology result. Return to normal activity tomorrow.      Upper GI endoscopy: Resume your previous diet, continue your medications, recommendation to repeat upper endoscopy in three months for follow up of Draper's ablation. Return to normal activity tomorrow.      If you experience any problems or have any questions following discharge from the GI Lab, please call:  Before 5p.m.  [phone]  After 5p.m.    [phone]     Nurse Signature                                                                        Date___________________                                                                            Patient/Responsible Party Signature                                        Date___________________

## 2024-09-26 NOTE — ANESTHESIA POSTPROCEDURE EVALUATION
Patient: Abner Fabian    Procedure Summary       Date: 09/26/24 Room / Location: [facility]    Anesthesia Start: 0928 Anesthesia Stop: 1004    Procedure: COLONOSCOPY Diagnosis: Screen for colon cancer    Scheduled Providers: Elkin Gomez MD; Serafin De La Fuente DO; EMMANUELLE Pena Responsible Provider: Serafin De La Fuente DO    Anesthesia Type: general ASA Status: 4            Anesthesia Type: general    Vitals Value Taken Time   /66 09/26/24 1025   Temp 36.2 °C (97.2 °F) 09/26/24 1025   Pulse 54 09/26/24 1025   Resp 16 09/26/24 1025   SpO2 96 % 09/26/24 1025       Anesthesia Post Evaluation    Patient location during evaluation: bedside  Patient participation: complete - patient participated  Level of consciousness: awake and alert  Pain management: adequate  Multimodal analgesia pain management approach  Airway patency: patent  Two or more strategies used to mitigate risk of obstructive sleep apnea  Cardiovascular status: acceptable  Respiratory status: acceptable  Hydration status: acceptable  Postoperative Nausea and Vomiting: none        No notable events documented.

## 2024-09-26 NOTE — ANESTHESIA PREPROCEDURE EVALUATION
Patient: Abner Fabian    Procedure Information       Date/Time: 09/26/24 0920    Scheduled providers: Elkin Gomez MD; Serafin De La Fuente DO; EMMANUELLE Pena    Procedure: COLONOSCOPY    Location: [facility]            Relevant Problems   Anesthesia (within normal limits)      Cardiac   (+) Atherosclerotic heart disease of native coronary artery without angina pectoris   (+) Congestive heart failure   (+) Essential hypertension   (+) Hypercholesterolemia      Neuro   (+) Cervical radiculopathy      /Renal   (+) End stage renal disease (Multi)      Endocrine   (+) Diabetic neuropathy (Multi)   (+) Proliferative diabetic retinopathy of both eyes associated with type 2 diabetes mellitus (Multi)   (+) Type 2 diabetes mellitus without complication (Multi)      Hematology   (+) Anemia       Clinical information reviewed:   Tobacco  Allergies  Meds   Med Hx  Surg Hx   Fam Hx  Soc Hx        NPO Detail:  No data recorded     Physical Exam    Airway  Mallampati: II  TM distance: >3 FB  Neck ROM: full     Cardiovascular   Comments: deferred   Dental   Comments: No loose teeth   Pulmonary   Comments: deferred   Abdominal     Comments: deferred           Anesthesia Plan    History of general anesthesia?: yes  History of complications of general anesthesia?: no    ASA 4     general     The patient is not a current smoker.  Patient was not previously instructed to abstain from smoking on day of procedure.  Patient did not smoke on day of procedure.  Education provided regarding risk of obstructive sleep apnea.  intravenous induction   Postoperative administration of opioids is intended.  Anesthetic plan and risks discussed with patient.  Use of blood products discussed with patient who consented to blood products.    Plan discussed with CRNA and EMMANUELLE.

## 2024-09-27 ENCOUNTER — APPOINTMENT (OUTPATIENT)
Dept: ENDOCRINOLOGY | Facility: CLINIC | Age: 54
End: 2024-09-27
Payer: MEDICARE

## 2024-09-27 LAB
LABORATORY COMMENT REPORT: NORMAL
PATH REPORT.FINAL DX SPEC: NORMAL
PATH REPORT.GROSS SPEC: NORMAL
PATH REPORT.RELEVANT HX SPEC: NORMAL
PATH REPORT.TOTAL CANCER: NORMAL

## 2024-10-02 ENCOUNTER — APPOINTMENT (OUTPATIENT)
Dept: OTOLARYNGOLOGY | Facility: CLINIC | Age: 54
End: 2024-10-02
Payer: MEDICARE

## 2024-10-02 VITALS — TEMPERATURE: 97.8 F | HEIGHT: 73 IN | WEIGHT: 185 LBS | BODY MASS INDEX: 24.52 KG/M2

## 2024-10-02 DIAGNOSIS — H90.6 MIXED HEARING LOSS, BILATERAL: ICD-10-CM

## 2024-10-02 DIAGNOSIS — H90.3 ASYMMETRIC SENSORINEURAL DEAFNESS: Primary | ICD-10-CM

## 2024-10-02 PROCEDURE — 99203 OFFICE O/P NEW LOW 30 MIN: CPT | Performed by: OTOLARYNGOLOGY

## 2024-10-02 PROCEDURE — 3048F LDL-C <100 MG/DL: CPT | Performed by: OTOLARYNGOLOGY

## 2024-10-02 PROCEDURE — 3008F BODY MASS INDEX DOCD: CPT | Performed by: OTOLARYNGOLOGY

## 2024-10-02 PROCEDURE — 4010F ACE/ARB THERAPY RXD/TAKEN: CPT | Performed by: OTOLARYNGOLOGY

## 2024-10-02 PROCEDURE — 1036F TOBACCO NON-USER: CPT | Performed by: OTOLARYNGOLOGY

## 2024-10-02 NOTE — PROGRESS NOTES
Chief Complaint   Patient presents with    New Patient Visit     NP HEARING LOSS, BROUGHT OUTSIDE AUDIO      Date of Evaluation: 10/2/2024   HPI  Abner Fabian is a 54 y.o. male seen in consultation at the request of advanced audiology for asymmetric hearing loss.  He has a history of diabetes chronic renal failure on dialysis.  Approximately 2 years ago he reports a sudden hearing loss in the left ear.  He has a history of extensive noise exposure but mostly in a symmetric fashion.  He denies dizziness.  He has tinnitus.  His audiogram shows a mild sloping to profound left mixed hearing loss and a high-frequency right hearing loss.  Tympanometry is normal.  He denies prior ear trouble or ear surgery.  No history of CVA       Past Medical History:   Diagnosis Date    Diabetes (Multi)     Diabetic retinopathy (Multi)     Hemangioma     Hypertension     Kidney failure     Macular hole     Macular hole OS    Orbital mass     Orbital bone capillary hemangioma mass      Past Surgical History:   Procedure Laterality Date    KNEE SURGERY  10/2016    OTHER SURGICAL HISTORY Left 08/2017    great toe amputation    OTHER SURGICAL HISTORY  07/2019    removal of permacath    OTHER SURGICAL HISTORY Left     AV fistula    RETINAL DETACHMENT SURGERY Left 01/17/2024    Macular hole repair sx OS 1/17/24 Dr. Cohen          Medications:   Current Outpatient Medications   Medication Instructions    amLODIPine (Norvasc) 10 mg tablet 1 tablet, oral, Daily    atorvastatin (LIPITOR) 10 mg, oral, Daily    blood sugar diagnostic strip TEST FOUR TIMES DAILY    carvedilol (Coreg) 25 mg tablet 1 tablet, oral, 2 times daily (morning and late afternoon)    folic acid/vit B complex and C (SARAH-AJAY ORAL) 1 tablet, oral, Daily RT    FreeStyle Florinda 3 Sensor device 1 each, miscellaneous, Every 14 days    hydrALAZINE (Apresoline) 100 mg tablet 1 tablet, oral, 3 times daily, with food    iron sucrose (VENOFER) 50 mg, intravenous, Weekly    losartan  "(Cozaar) 100 mg tablet 1 tablet, oral, Daily    methoxy peg-epoetin beta (MIRCERA INJ) 200 mcg, Every 14 days    prednisoLONE acetate (Pred-Forte) 1 % ophthalmic suspension 1 drop, Left Eye, 2 times daily    SITagliptin phosphate (JANUVIA) 25 mg, oral, Daily    timolol (Timoptic) 0.5 % ophthalmic solution 1 drop, Left Eye, 2 times daily        Allergies:  No Known Allergies     Physical Exam:  Last Recorded Vitals  Temperature 36.6 °C (97.8 °F), height 1.854 m (6' 1\"), weight 83.9 kg (185 lb).  []General appearance: Well-developed, well-nourished in no acute distress, conversant with normal voice quality    Head/face: No erythema or edema or facial tenderness, and normal facial nerve function bilaterally    External ear: Clear external auditory canals with normal pinnae right cerumen impaction removed  Tube status: N/A  Middle ear: Tympanic membranes intact and mobile, middle ears normal.  Tympanic membrane perforation: N/A  Mastoid bowl: N/A  Hearing: Normal conversational awareness at normal speech thresholds    Nose visualized using: Anterior rhinoscopy  Nasal dorsum: Nontraumatic midline appearance  Septum: Midline, nonobstructing  Inferior turbinates: Normal, pink  Secretions: Dry    Oral cavity and oropharynx: Normal  Teeth: Good condition  Floor of mouth: without lesions  Palate: Normal hard palate, soft palate and uvula  Oropharynx: Clear, no lesions present  Buccal mucosa: Normal without masses or lesions  Lips: Normal    Nasopharynx: Inadequate mirror exam secondary to gag/anatomy    Neck:  Salivary glands: Normal bilateral parotid and submandibular glands by inspection and palpation.  Non-thyroid masses: No palpable masses or significant lymphadenopathy  Trachea: Midline  Thyroid: No thyromegaly or palpable nodules  Temporomandibular joint: Nontender  Cervical range of motion: Normal    Neurologic exam: Alert and oriented x3, appropriate affect.  Cranial nerves II-XII normal bilaterally  Extraocular " movement: Extraocular movement intact, normal gaze alignment        Abner was seen today for new patient visit.  Diagnoses and all orders for this visit:  Asymmetric sensorineural deafness (Primary)  Mixed hearing loss, bilateral       PLAN  It sounds as though he had a sudden sensorineural hearing loss 2 years ago and is just now getting around to getting it checked.  I would like to check an MRI of the internal auditory canal.  He will call me for results.  If this is normal he will proceed with hearing aid evaluation and recheck audiogram in 1 year    Ned Mccall MD

## 2024-10-09 NOTE — PERIOPERATIVE NURSING NOTE
DIALYSIS MONDAY, WED, FRI   Please see patient E-mail and advise.        Mio Manuel MD  10/5/2024 10:37 PM CDT       Pls notify pt  His labs showed his cbc , A1c, tsh thyroid were normal  His BNP test for heart failure was   normal;l urine was fine except was highy concentrated with slight presence of protein which is clinically not significant and both due to not drinking enough water/fluids before test was done.    His chem panel was good except for glucose slgihtly elevated in prediabetic range so watch carbohydrates in diet.  His alk phos slightly elevated, will do additional  labs just to  rule  out possible cause of low alk phos,, will check his phosphorus,  magnesium .. B12, ceruloplasmin test.  His total chol high at 271 and his LDL bad chol also very high at 203 which is indication to start statin therapy based on current guidelines.  Potential side effects are  myalgias and elevation  of liver enzymes though incidence is low. If he agrees, we can start crestor 10mg HS #90 and ffup with me in 3mos.

## 2024-10-16 ENCOUNTER — APPOINTMENT (OUTPATIENT)
Dept: OTOLARYNGOLOGY | Facility: CLINIC | Age: 54
End: 2024-10-16
Payer: MEDICARE

## 2024-10-21 ENCOUNTER — DOCUMENTATION (OUTPATIENT)
Dept: GASTROENTEROLOGY | Facility: HOSPITAL | Age: 54
End: 2024-10-21
Payer: MEDICARE

## 2024-10-21 NOTE — PROGRESS NOTES
Dr. Jb Meredith's office received a referral from Dr. Vincent Hernandez for this patient to undergo a colonoscopy with EMR. Dr. Miguel Tang reviewed the referral on 10/18/2024. Per Dr. Tang, OK to schedule colonoscopy and patient will need to take a 2 day bowel prep prior to procedure.    Arielle Fleming was notified to call and schedule this patient. Contact Lesli Abrams RN at [phone] for any questions.      See below for supporting clinical information from the referral sent by Dr. Hernandez's office and from medical records:    Refer for EMR    Hx of CKD, on dialysis M-W-F. Fistula left arm    Colonoscopy  Order# [id number]  Reading physician: Elkin Gomez MD            Ordering provider: Elkin Gomez MD            Study date: 9/26/24    Result Information    Status: Final result (Exam End: 9/26/2024 09:55)  Provider Status: Open    Result Text    Result Text  Impression  -         Two polyps measuring 20 mm or greater in the ileocecal valve; performed cold forceps biopsy  -         One polyp measuring 10-19 mm in the proximal sigmoid colon  -         The terminal ileum appeared normal.-          Subcentimeter polyp in the descending colon       Findings  -          Two sessile, adenomatous-appearing and multilobulated polyps measuring 20 mm or greater in the ileocecal valve; performed cold forceps biopsy  -          One pedunculated polyp measuring 10-19 mm in the proximal sigmoid colon  -          The terminal ileum appeared normal.  -         One sessile polyp measuring 5-9 mm in the descending colon       Recommendation    Await pathology results  Patient will need mucosal resection of polyp on ileocecal valve as well as removal of second smaller polyp on the valve. Two polyps were seen in descending and sigmoid colon but view was limited somewhat for smaller polyps by prep.          Indication  Screen for colon cancer    Staff    Staff     Role  Elkin Gomez MD    Proceduralist        Medications    simethicone (Mylicon) drops  100 mg  (Totals for administrations occurring from 0917 to 0952 on 09/26/24)       Preprocedure  A history and physical has been performed, and patient medication allergies have been reviewed. The patient's tolerance of previous anesthesia has been reviewed. The risks and benefits of the procedure and the sedation options and risks were discussed with the patient. All questions were answered and informed consent obtained.    Details of the Procedure  The patient underwent monitored anesthesia care, which was administered by an anesthesia professional. The patient's blood pressure, ECG, ETCO2, heart rate, level of consciousness, oxygen and respirations were monitored throughout the procedure. A digital rectal exam was performed. The scope was introduced through the anus and advanced to the terminal ileum. The quality of bowel preparation was evaluated using the [place] Bowel Preparation Scale with scores of: right colon = 2, transverse colon = 2, left colon = 2. The total BBPS score was 6. Bowel prep was adequate. The patient experienced no blood loss. The procedure was not difficult. The patient tolerated the procedure well. There were no apparent adverse events.      Events    Procedure Events  Event   Event Time  ENDO SCOPE IN TIME 9/26/2024  9:36 AM  ENDO CECUM REACHED         9/26/2024  9:40 AM  ENDO SCOPE OUT TIME          9/26/2024  9:52 AM       Specimens    ID         Type    Source Tests   Collected by            Time  1 : ileocecal valve        Tissue  ILEUM BIOPSY            SURGICAL PATHOLOGY EXAM Ehsan Parker RN         9/26/2024 0946       Procedure Location  [facility]  [id number] [place] [id number] [phone]    Referring Provider  Elkin Gomez MD    Procedure Provider  Elkin Gomez MD    Surgical Pathology Exam: [id number]  Order: [id number]   Collected 9/26/2024 09:46       Status: Final result        Visible to patient: Yes (seen)       Dx: Screen for colon cancer    0 Result Notes                Component       FINAL DIAGNOSIS    ILEOCECAL VALVE, BIOPSY:              Fragments of tubular adenoma.

## 2024-11-07 ENCOUNTER — HOSPITAL ENCOUNTER (OUTPATIENT)
Dept: RADIOLOGY | Facility: CLINIC | Age: 54
Discharge: HOME | End: 2024-11-07
Payer: MEDICARE

## 2024-11-07 DIAGNOSIS — Z00.00 ENCOUNTER FOR INITIAL ANNUAL WELLNESS VISIT IN MEDICARE PATIENT: ICD-10-CM

## 2024-11-07 PROCEDURE — 75571 CT HRT W/O DYE W/CA TEST: CPT

## 2024-12-12 ENCOUNTER — ANESTHESIA EVENT (OUTPATIENT)
Dept: GASTROENTEROLOGY | Facility: HOSPITAL | Age: 54
End: 2024-12-12
Payer: MEDICARE

## 2024-12-12 ENCOUNTER — HOSPITAL ENCOUNTER (OUTPATIENT)
Dept: GASTROENTEROLOGY | Facility: HOSPITAL | Age: 54
Discharge: HOME | End: 2024-12-12
Payer: MEDICARE

## 2024-12-12 ENCOUNTER — ANESTHESIA (OUTPATIENT)
Dept: GASTROENTEROLOGY | Facility: HOSPITAL | Age: 54
End: 2024-12-12
Payer: MEDICARE

## 2024-12-12 VITALS
WEIGHT: 190.26 LBS | BODY MASS INDEX: 25.22 KG/M2 | TEMPERATURE: 97 F | SYSTOLIC BLOOD PRESSURE: 190 MMHG | DIASTOLIC BLOOD PRESSURE: 80 MMHG | RESPIRATION RATE: 15 BRPM | HEART RATE: 57 BPM | OXYGEN SATURATION: 100 % | HEIGHT: 73 IN

## 2024-12-12 DIAGNOSIS — K63.5 POLYP OF COLON, UNSPECIFIED PART OF COLON, UNSPECIFIED TYPE: ICD-10-CM

## 2024-12-12 LAB
GLUCOSE BLD MANUAL STRIP-MCNC: 169 MG/DL (ref 74–99)
GLUCOSE BLD MANUAL STRIP-MCNC: 195 MG/DL (ref 74–99)

## 2024-12-12 PROCEDURE — 3700000001 HC GENERAL ANESTHESIA TIME - INITIAL BASE CHARGE

## 2024-12-12 PROCEDURE — 2500000004 HC RX 250 GENERAL PHARMACY W/ HCPCS (ALT 636 FOR OP/ED): Performed by: REGISTERED NURSE

## 2024-12-12 PROCEDURE — 2720000007 HC OR 272 NO HCPCS

## 2024-12-12 PROCEDURE — C1889 IMPLANT/INSERT DEVICE, NOC: HCPCS

## 2024-12-12 PROCEDURE — 45385 COLONOSCOPY W/LESION REMOVAL: CPT | Performed by: INTERNAL MEDICINE

## 2024-12-12 PROCEDURE — 45388 COLONOSCOPY W/ABLATION: CPT | Performed by: INTERNAL MEDICINE

## 2024-12-12 PROCEDURE — 7100000009 HC PHASE TWO TIME - INITIAL BASE CHARGE

## 2024-12-12 PROCEDURE — 3700000002 HC GENERAL ANESTHESIA TIME - EACH INCREMENTAL 1 MINUTE

## 2024-12-12 PROCEDURE — 45390 COLONOSCOPY W/RESECTION: CPT | Performed by: INTERNAL MEDICINE

## 2024-12-12 PROCEDURE — 82947 ASSAY GLUCOSE BLOOD QUANT: CPT

## 2024-12-12 PROCEDURE — 7100000010 HC PHASE TWO TIME - EACH INCREMENTAL 1 MINUTE

## 2024-12-12 RX ORDER — LIDOCAINE HYDROCHLORIDE 20 MG/ML
INJECTION, SOLUTION EPIDURAL; INFILTRATION; INTRACAUDAL; PERINEURAL AS NEEDED
Status: DISCONTINUED | OUTPATIENT
Start: 2024-12-12 | End: 2024-12-12

## 2024-12-12 RX ORDER — PROPOFOL 10 MG/ML
INJECTION, EMULSION INTRAVENOUS AS NEEDED
Status: DISCONTINUED | OUTPATIENT
Start: 2024-12-12 | End: 2024-12-12

## 2024-12-12 SDOH — HEALTH STABILITY: MENTAL HEALTH: CURRENT SMOKER: 0

## 2024-12-12 ASSESSMENT — COLUMBIA-SUICIDE SEVERITY RATING SCALE - C-SSRS
2. HAVE YOU ACTUALLY HAD ANY THOUGHTS OF KILLING YOURSELF?: NO
6. HAVE YOU EVER DONE ANYTHING, STARTED TO DO ANYTHING, OR PREPARED TO DO ANYTHING TO END YOUR LIFE?: NO
1. IN THE PAST MONTH, HAVE YOU WISHED YOU WERE DEAD OR WISHED YOU COULD GO TO SLEEP AND NOT WAKE UP?: NO

## 2024-12-12 ASSESSMENT — PAIN SCALES - GENERAL
PAINLEVEL_OUTOF10: 0 - NO PAIN
PAINLEVEL_OUTOF10: 0 - NO PAIN
PAIN_LEVEL: 0
PAINLEVEL_OUTOF10: 0 - NO PAIN

## 2024-12-12 ASSESSMENT — ENCOUNTER SYMPTOMS: CONSTITUTIONAL NEGATIVE: 1

## 2024-12-12 ASSESSMENT — PAIN - FUNCTIONAL ASSESSMENT
PAIN_FUNCTIONAL_ASSESSMENT: UNABLE TO SELF-REPORT
PAIN_FUNCTIONAL_ASSESSMENT: 0-10

## 2024-12-12 NOTE — ANESTHESIA PREPROCEDURE EVALUATION
Patient: Abner Fabian    Procedure Information       Date/Time: 12/12/24 1050    Scheduled providers: Jb Meredith DO; Juancarlos Mendez MD; LEWIS Yoder-CARLO, DNAP    Procedure: COLONOSCOPY    Location: [facility]            Relevant Problems   Cardiac   (+) Atherosclerotic heart disease of native coronary artery without angina pectoris   (+) Congestive heart failure   (+) Essential hypertension   (+) Hypercholesterolemia      Pulmonary (within normal limits)      Neuro   (+) Cervical radiculopathy      /Renal   (+) End stage renal disease (Multi)      Liver (within normal limits)      Endocrine   (+) Diabetic neuropathy (Multi)   (+) Proliferative diabetic retinopathy of both eyes associated with type 2 diabetes mellitus (Multi)   (+) Type 2 diabetes mellitus without complication (Multi)      Hematology   (+) Anemia      HEENT (within normal limits)      ID (within normal limits)      Skin (within normal limits)       Clinical information reviewed:   Tobacco  Allergies  Meds  Problems  Med Hx  Surg Hx   Fam Hx  Soc   Hx        NPO Detail:  NPO/Void Status  Carbohydrate Drink Given Prior to Surgery? : N  Date of Last Liquid: 12/12/24  Time of Last Liquid: 0530  Date of Last Solid: 12/10/24  Time of Last Solid: 2000  Last Intake Type: Clear fluids         Physical Exam    Airway  Mallampati: I  Neck ROM: full     Cardiovascular    Dental    Pulmonary - normal exam     Abdominal - normal exam             Anesthesia Plan    History of general anesthesia?: yes  History of complications of general anesthesia?: no    ASA 3     general     The patient is not a current smoker.    intravenous induction   Anesthetic plan and risks discussed with patient.  Use of blood products discussed with patient who consented to blood products.

## 2024-12-12 NOTE — H&P
History Of Present Illness  Abner Fabian is a 54 y.o. male presenting with large colon polyps referred by Dr. Gomez/Dr. Hernandez.     Past Medical History  Past Medical History:   Diagnosis Date    Diabetes (Multi)     Diabetic retinopathy (Multi)     ESRD (end stage renal disease) on dialysis (Multi)     ON HD MWF schedule    Hemangioma     Hypertension     Kidney failure     Macular hole     Macular hole OS    Orbital mass     Orbital bone capillary hemangioma mass     Surgical History  Past Surgical History:   Procedure Laterality Date    KNEE SURGERY  10/2016    NECK SURGERY      c5-6    OTHER SURGICAL HISTORY Left 08/2017    great toe amputation    OTHER SURGICAL HISTORY  07/2019    removal of permacath    OTHER SURGICAL HISTORY Left     AV fistula    RETINAL DETACHMENT SURGERY Left 01/17/2024    Macular hole repair sx OS 1/17/24 Dr. Cohen     Social History  He reports that he has never smoked. He has been exposed to tobacco smoke. He has never used smokeless tobacco. He reports that he does not drink alcohol and does not use drugs.    Family History  Family History   Problem Relation Name Age of Onset    Diabetes Mother      Heart disease Mother      Hypertension Mother      No Known Problems Father      Diabetes Brother      No Known Problems Brother      No Known Problems Brother      No Known Problems Daughter      No Known Problems Son      No Known Problems Son      No Known Problems Son      Diabetes Maternal Grandmother      Diabetes Maternal Grandfather      Heart disease Maternal Grandfather      Hypertension Maternal Grandfather          Allergies  No Known Allergies  Review of Systems   Constitutional: Negative.         Physical Exam  Constitutional:       Appearance: Normal appearance. He is normal weight.   Cardiovascular:      Rate and Rhythm: Normal rate and regular rhythm.   Pulmonary:      Effort: Pulmonary effort is normal.      Breath sounds: Normal breath sounds.   Abdominal:       "General: Abdomen is flat.      Palpations: Abdomen is soft.   Neurological:      Mental Status: He is alert.   Psychiatric:         Mood and Affect: Mood normal.         Behavior: Behavior normal.         Thought Content: Thought content normal.         Judgment: Judgment normal.          Last Recorded Vitals  Blood pressure (!) 197/78, pulse 63, temperature 36 °C (96.8 °F), temperature source Temporal, resp. rate 16, height 1.854 m (6' 1\"), weight 86.3 kg (190 lb 4.1 oz), SpO2 100%.    Assessment/Plan   Colonoscopy with EMR as planned     Jb Meredith, DO  "

## 2024-12-12 NOTE — DISCHARGE INSTRUCTIONS

## 2024-12-12 NOTE — ANESTHESIA POSTPROCEDURE EVALUATION
Patient: Abner Fabian    Procedure Summary       Date: 12/12/24 Room / Location: [facility]    Anesthesia Start: 1042 Anesthesia Stop: 1133    Procedure: COLONOSCOPY Diagnosis: Polyp of colon, unspecified part of colon, unspecified type    Scheduled Providers: Jb Meredith DO; Juancarlos Mendez MD; LEWIS Yoder-CARLO, DNAP Responsible Provider: Juancarlos Mendez MD    Anesthesia Type: general ASA Status: 3            Anesthesia Type: general    Vitals Value Taken Time   /80 12/12/24 1215   Temp 36.1 °C (97 °F) 12/12/24 1130   Pulse 57 12/12/24 1215   Resp 15 12/12/24 1215   SpO2 100 % 12/12/24 1215       Anesthesia Post Evaluation    Patient location during evaluation: bedside  Patient participation: complete - patient participated  Level of consciousness: awake and alert  Pain score: 0  Pain management: satisfactory to patient  Airway patency: patent  Cardiovascular status: acceptable  Respiratory status: acceptable  Hydration status: acceptable  Postoperative Nausea and Vomiting: none        No notable events documented.

## 2024-12-13 ASSESSMENT — PAIN SCALES - GENERAL: PAINLEVEL_OUTOF10: 0 - NO PAIN

## 2025-02-07 DIAGNOSIS — E11.9 TYPE 2 DIABETES MELLITUS WITHOUT COMPLICATION, UNSPECIFIED WHETHER LONG TERM INSULIN USE (MULTI): ICD-10-CM

## 2025-02-10 RX ORDER — SITAGLIPTIN 25 MG/1
25 TABLET, FILM COATED ORAL DAILY
Qty: 90 TABLET | Refills: 1 | Status: SHIPPED | OUTPATIENT
Start: 2025-02-10

## 2025-02-18 ENCOUNTER — HOSPITAL ENCOUNTER (OUTPATIENT)
Dept: RADIOLOGY | Facility: CLINIC | Age: 55
Discharge: HOME | End: 2025-02-18
Payer: MEDICARE

## 2025-02-18 DIAGNOSIS — R05.9 COUGH, UNSPECIFIED: ICD-10-CM

## 2025-02-18 PROCEDURE — 71046 X-RAY EXAM CHEST 2 VIEWS: CPT | Performed by: RADIOLOGY

## 2025-02-18 PROCEDURE — 71046 X-RAY EXAM CHEST 2 VIEWS: CPT

## 2025-03-12 ENCOUNTER — OFFICE VISIT (OUTPATIENT)
Dept: PRIMARY CARE | Facility: CLINIC | Age: 55
End: 2025-03-12
Payer: MEDICARE

## 2025-03-12 VITALS
OXYGEN SATURATION: 96 % | SYSTOLIC BLOOD PRESSURE: 138 MMHG | WEIGHT: 183.2 LBS | HEART RATE: 77 BPM | BODY MASS INDEX: 24.17 KG/M2 | TEMPERATURE: 97.8 F | DIASTOLIC BLOOD PRESSURE: 70 MMHG

## 2025-03-12 DIAGNOSIS — I13.0 HYPERTENSIVE HEART AND CHRONIC KIDNEY DISEASE WITH HEART FAILURE AND STAGE 1 THROUGH STAGE 4 CHRONIC KIDNEY DISEASE, OR UNSPECIFIED CHRONIC KIDNEY DISEASE: ICD-10-CM

## 2025-03-12 DIAGNOSIS — E55.9 VITAMIN D DEFICIENCY: ICD-10-CM

## 2025-03-12 DIAGNOSIS — N18.5 ANEMIA DUE TO STAGE 5 CHRONIC KIDNEY DISEASE, NOT ON CHRONIC DIALYSIS (MULTI): ICD-10-CM

## 2025-03-12 DIAGNOSIS — E11.22 TYPE 2 DIABETES MELLITUS WITH STAGE 5 CHRONIC KIDNEY DISEASE NOT ON CHRONIC DIALYSIS, UNSPECIFIED WHETHER LONG TERM INSULIN USE (MULTI): ICD-10-CM

## 2025-03-12 DIAGNOSIS — N18.5 TYPE 2 DIABETES MELLITUS WITH STAGE 5 CHRONIC KIDNEY DISEASE NOT ON CHRONIC DIALYSIS, UNSPECIFIED WHETHER LONG TERM INSULIN USE (MULTI): ICD-10-CM

## 2025-03-12 DIAGNOSIS — R05.3 CHRONIC COUGH: Primary | ICD-10-CM

## 2025-03-12 DIAGNOSIS — D63.1 ANEMIA DUE TO STAGE 5 CHRONIC KIDNEY DISEASE, NOT ON CHRONIC DIALYSIS (MULTI): ICD-10-CM

## 2025-03-12 DIAGNOSIS — R53.83 OTHER FATIGUE: ICD-10-CM

## 2025-03-12 DIAGNOSIS — Z13.29 SCREENING FOR THYROID DISORDER: ICD-10-CM

## 2025-03-12 LAB
POC RAPID INFLUENZA A: NEGATIVE
POC RAPID INFLUENZA B: NEGATIVE

## 2025-03-12 PROCEDURE — 4010F ACE/ARB THERAPY RXD/TAKEN: CPT | Performed by: NURSE PRACTITIONER

## 2025-03-12 PROCEDURE — 3078F DIAST BP <80 MM HG: CPT | Performed by: NURSE PRACTITIONER

## 2025-03-12 PROCEDURE — 1036F TOBACCO NON-USER: CPT | Performed by: NURSE PRACTITIONER

## 2025-03-12 PROCEDURE — 3075F SYST BP GE 130 - 139MM HG: CPT | Performed by: NURSE PRACTITIONER

## 2025-03-12 PROCEDURE — 99214 OFFICE O/P EST MOD 30 MIN: CPT | Performed by: NURSE PRACTITIONER

## 2025-03-12 PROCEDURE — 87804 INFLUENZA ASSAY W/OPTIC: CPT | Mod: QW | Performed by: NURSE PRACTITIONER

## 2025-03-12 RX ORDER — ATORVASTATIN CALCIUM 20 MG/1
20 TABLET, FILM COATED ORAL DAILY
COMMUNITY
Start: 2024-09-17

## 2025-03-12 RX ORDER — SEVELAMER CARBONATE 800 MG/1
TABLET, FILM COATED ORAL
COMMUNITY
Start: 2024-11-19

## 2025-03-12 ASSESSMENT — ENCOUNTER SYMPTOMS
OCCASIONAL FEELINGS OF UNSTEADINESS: 0
HEMOPTYSIS: 0
DEPRESSION: 0
WHEEZING: 0
CHILLS: 0
HEARTBURN: 0
FEVER: 0
RHINORRHEA: 0
MYALGIAS: 0
SHORTNESS OF BREATH: 0
SORE THROAT: 0
SWEATS: 0
LOSS OF SENSATION IN FEET: 0
BLURRED VISION: 1
COUGH: 1
WEIGHT LOSS: 0

## 2025-03-12 ASSESSMENT — PATIENT HEALTH QUESTIONNAIRE - PHQ9
2. FEELING DOWN, DEPRESSED OR HOPELESS: NOT AT ALL
SUM OF ALL RESPONSES TO PHQ9 QUESTIONS 1 AND 2: 0
1. LITTLE INTEREST OR PLEASURE IN DOING THINGS: NOT AT ALL

## 2025-03-12 ASSESSMENT — PAIN SCALES - GENERAL: PAINLEVEL_OUTOF10: 0-NO PAIN

## 2025-03-12 NOTE — PROGRESS NOTES
Subjective   Patient ID: Abner Fabian is a 54 y.o. male who presents for Cough (PT C/O COUGH X 3 MONTHS. PT HAVE XRAY 2/18).    HAS HAD A COUGH FOR OVER 3 MONTHS, WAS GOING INTO BROSPASMS, HAS NOT REALLY SICK, OCCASIONALLY SL FEVER; FATIGED MOTHER HAS BEEN IN HOSPITALWITH PNEUMONIA INFLENZA A  BRNCHITIS M NO FEVER , , denies gerd in last 2 days cough much improved . Dr chan ordered +    Cough  This is a chronic problem. The current episode started more than 1 month ago. The problem has been waxing and waning. The problem occurs every few hours. The cough is Productive of sputum. Pertinent negatives include no chest pain, chills, ear congestion, ear pain, fever, heartburn, hemoptysis, myalgias, nasal congestion, postnasal drip, rash, rhinorrhea, sore throat, shortness of breath, sweats, weight loss or wheezing. Nothing aggravates the symptoms. Risk factors for lung disease include animal exposure and occupational exposure. He has tried nothing for the symptoms.   Diabetes  He presents for his follow-up diabetic visit. He has type 2 diabetes mellitus. Pertinent negatives for hypoglycemia include no sweats. Associated symptoms include blurred vision. Pertinent negatives for diabetes include no chest pain and no weight loss. Risk factors for coronary artery disease include diabetes mellitus, dyslipidemia, male sex and sedentary lifestyle. He is compliant with treatment most of the time. He is following a generally healthy diet. He has not had a previous visit with a dietitian. He participates in exercise intermittently. He sees a podiatrist.Eye exam is current.        Review of Systems   Constitutional:  Negative for chills, fever and weight loss.   HENT:  Negative for ear pain, postnasal drip, rhinorrhea and sore throat.    Eyes:  Positive for blurred vision.   Respiratory:  Positive for cough. Negative for hemoptysis, shortness of breath and wheezing.    Cardiovascular:  Negative for chest pain.    Gastrointestinal:  Negative for heartburn.   Musculoskeletal:  Negative for myalgias.   Skin:  Negative for rash.       Objective   /70   Pulse 77   Temp 36.6 °C (97.8 °F)   Wt 83.1 kg (183 lb 3.2 oz)   SpO2 96%   BMI 24.17 kg/m²     Physical Exam  Constitutional:       Appearance: Normal appearance.      Comments: Fatigued    HENT:      Right Ear: Tympanic membrane normal.      Left Ear: Tympanic membrane normal.      Mouth/Throat:      Mouth: Mucous membranes are moist.   Cardiovascular:      Rate and Rhythm: Normal rate and regular rhythm.      Pulses: Normal pulses.      Heart sounds: Normal heart sounds.   Pulmonary:      Effort: Pulmonary effort is normal.      Breath sounds: Normal breath sounds.   Neurological:      Mental Status: He is alert and oriented to person, place, and time.   Psychiatric:         Behavior: Behavior normal.         Assessment/Plan   Problem List Items Addressed This Visit             ICD-10-CM    Anemia D64.9    Relevant Orders    CBC and Auto Differential    Diabetes mellitus (Multi) E11.9    Relevant Orders    Albumin-Creatinine Ratio, Urine Random    Hemoglobin A1C     Other Visit Diagnoses         Codes    Chronic cough    -  Primary R05.3    Relevant Orders    B-Type Natriuretic Peptide    POCT Influenza A/B manually resulted (Completed)    Other fatigue     R53.83    Screening for thyroid disorder     Z13.29    Relevant Orders    TSH with reflex to Free T4 if abnormal    Vitamin D deficiency     E55.9    Relevant Orders    Vitamin D 25-Hydroxy,Total (for eval of Vitamin D levels)    Hypertensive heart and chronic kidney disease with heart failure and stage 1 through stage 4 chronic kidney disease, or unspecified chronic kidney disease     I13.0    Relevant Orders    B-Type Natriuretic Peptide        Call if cough returns roxy call withlabs . Prayers for a new kidney!!

## 2025-03-13 DIAGNOSIS — N18.6 TYPE 2 DIABETES MELLITUS WITH CHRONIC KIDNEY DISEASE ON CHRONIC DIALYSIS, UNSPECIFIED WHETHER LONG TERM INSULIN USE (MULTI): Primary | ICD-10-CM

## 2025-03-13 DIAGNOSIS — Z99.2 TYPE 2 DIABETES MELLITUS WITH CHRONIC KIDNEY DISEASE ON CHRONIC DIALYSIS, UNSPECIFIED WHETHER LONG TERM INSULIN USE (MULTI): Primary | ICD-10-CM

## 2025-03-13 DIAGNOSIS — E11.22 TYPE 2 DIABETES MELLITUS WITH CHRONIC KIDNEY DISEASE ON CHRONIC DIALYSIS, UNSPECIFIED WHETHER LONG TERM INSULIN USE (MULTI): Primary | ICD-10-CM

## 2025-03-14 ENCOUNTER — TELEPHONE (OUTPATIENT)
Dept: PRIMARY CARE | Facility: CLINIC | Age: 55
End: 2025-03-14
Payer: MEDICARE

## 2025-03-14 LAB
25(OH)D3+25(OH)D2 SERPL-MCNC: 17 NG/ML (ref 30–100)
BASOPHILS # BLD AUTO: 99 CELLS/UL (ref 0–200)
BASOPHILS NFR BLD AUTO: 1.3 %
BNP SERPL-MCNC: >4200 PG/ML
EOSINOPHIL # BLD AUTO: 296 CELLS/UL (ref 15–500)
EOSINOPHIL NFR BLD AUTO: 3.9 %
ERYTHROCYTE [DISTWIDTH] IN BLOOD BY AUTOMATED COUNT: 14.7 % (ref 11–15)
HBA1C MFR BLD: 8.7 % OF TOTAL HGB
HCT VFR BLD AUTO: 32.9 % (ref 38.5–50)
HGB BLD-MCNC: 10.7 G/DL (ref 13.2–17.1)
LYMPHOCYTES # BLD AUTO: 1353 CELLS/UL (ref 850–3900)
LYMPHOCYTES NFR BLD AUTO: 17.8 %
MCH RBC QN AUTO: 31.5 PG (ref 27–33)
MCHC RBC AUTO-ENTMCNC: 32.5 G/DL (ref 32–36)
MCV RBC AUTO: 96.8 FL (ref 80–100)
MONOCYTES # BLD AUTO: 380 CELLS/UL (ref 200–950)
MONOCYTES NFR BLD AUTO: 5 %
NEUTROPHILS # BLD AUTO: 5472 CELLS/UL (ref 1500–7800)
NEUTROPHILS NFR BLD AUTO: 72 %
PLATELET # BLD AUTO: 265 THOUSAND/UL (ref 140–400)
PMV BLD REES-ECKER: 10.7 FL (ref 7.5–12.5)
RBC # BLD AUTO: 3.4 MILLION/UL (ref 4.2–5.8)
TSH SERPL-ACNC: 1.38 MIU/L (ref 0.4–4.5)
WBC # BLD AUTO: 7.6 THOUSAND/UL (ref 3.8–10.8)

## 2025-03-26 ENCOUNTER — OFFICE VISIT (OUTPATIENT)
Dept: VASCULAR SURGERY | Facility: CLINIC | Age: 55
End: 2025-03-26
Payer: MEDICARE

## 2025-03-26 VITALS
BODY MASS INDEX: 25.07 KG/M2 | HEART RATE: 63 BPM | WEIGHT: 190 LBS | SYSTOLIC BLOOD PRESSURE: 139 MMHG | DIASTOLIC BLOOD PRESSURE: 80 MMHG

## 2025-03-26 DIAGNOSIS — N18.6 END STAGE RENAL DISEASE (MULTI): Primary | ICD-10-CM

## 2025-03-26 PROCEDURE — 4010F ACE/ARB THERAPY RXD/TAKEN: CPT | Performed by: STUDENT IN AN ORGANIZED HEALTH CARE EDUCATION/TRAINING PROGRAM

## 2025-03-26 PROCEDURE — 3075F SYST BP GE 130 - 139MM HG: CPT | Performed by: STUDENT IN AN ORGANIZED HEALTH CARE EDUCATION/TRAINING PROGRAM

## 2025-03-26 PROCEDURE — 99203 OFFICE O/P NEW LOW 30 MIN: CPT | Performed by: STUDENT IN AN ORGANIZED HEALTH CARE EDUCATION/TRAINING PROGRAM

## 2025-03-26 PROCEDURE — 3079F DIAST BP 80-89 MM HG: CPT | Performed by: STUDENT IN AN ORGANIZED HEALTH CARE EDUCATION/TRAINING PROGRAM

## 2025-03-26 PROCEDURE — 99213 OFFICE O/P EST LOW 20 MIN: CPT | Performed by: STUDENT IN AN ORGANIZED HEALTH CARE EDUCATION/TRAINING PROGRAM

## 2025-03-26 ASSESSMENT — ENCOUNTER SYMPTOMS
OCCASIONAL FEELINGS OF UNSTEADINESS: 0
DEPRESSION: 0
LOSS OF SENSATION IN FEET: 0

## 2025-03-26 ASSESSMENT — COLUMBIA-SUICIDE SEVERITY RATING SCALE - C-SSRS
1. IN THE PAST MONTH, HAVE YOU WISHED YOU WERE DEAD OR WISHED YOU COULD GO TO SLEEP AND NOT WAKE UP?: NO
6. HAVE YOU EVER DONE ANYTHING, STARTED TO DO ANYTHING, OR PREPARED TO DO ANYTHING TO END YOUR LIFE?: NO
2. HAVE YOU ACTUALLY HAD ANY THOUGHTS OF KILLING YOURSELF?: NO

## 2025-03-26 ASSESSMENT — PAIN SCALES - GENERAL: PAINLEVEL_OUTOF10: 0-NO PAIN

## 2025-03-26 NOTE — PROGRESS NOTES
"Vascular Surgery Consult/Clinic Note    CC: aneurysmal fistula     HPI:  Abner Fabian is 54 y.o. male with ESRD on HD MWF at [facility] (nephrologist Dr. aLrose) who presents at the recommendation of his nephrologist since his fistula is \"bubbling out\". He has a left radiocephalic fistula placed in 2019 by Dr. Leary. He reports his fistula is unchanged in size recently, and he has had no ulcerations over the fistula nor any alarms with dialysis or prolonged bleeding. No cramping or pain in the hand reported.    Meds:   Current Outpatient Medications on File Prior to Visit   Medication Sig Dispense Refill    amLODIPine (Norvasc) 10 mg tablet Take 1 tablet (10 mg) by mouth once daily.      atorvastatin (Lipitor) 20 mg tablet Take 1 tablet (20 mg) by mouth once daily.      carvedilol (Coreg) 25 mg tablet Take 1 tablet (25 mg) by mouth 2 times daily (morning and late afternoon).      ergocalciferol (Vitamin D-2) 1250 mcg (50,000 units) capsule Take 1 capsule (50,000 Units) by mouth 1 (one) time per week. 12 capsule 3    folic acid/vit B complex and C (SARAH-AJAY ORAL) Take 1 tablet by mouth once daily.      FreeStyle Florinda 3 Sensor device 1 each every 14 (fourteen) days. 2 each 5    SITagliptin phosphate (Januvia) 50 mg tablet Take 1 tablet (50 mg) by mouth 2 times a day. 180 tablet 1    timolol (Timoptic) 0.5 % ophthalmic solution Administer 1 drop into the left eye 2 times a day.      hydrALAZINE (Apresoline) 100 mg tablet Take 1 tablet (100 mg) by mouth 3 times a day. with food (Patient not taking: Reported on 3/12/2025)      losartan (Cozaar) 100 mg tablet Take 1 tablet (100 mg) by mouth once daily. (Patient not taking: Reported on 3/26/2025)      prednisoLONE acetate (Pred-Forte) 1 % ophthalmic suspension Administer 1 drop into the left eye 2 times a day. (Patient not taking: No sig reported)      sevelamer carbonate (Renvela) 800 mg tablet TAKE 3 TABLETS BY MOUTH THREE TIMES DAILY WITH MEALS (Patient not " taking: Reported on 3/26/2025)       No current facility-administered medications on file prior to visit.        Allergies:   No Known Allergies    SH:    Social Drivers of Health     Tobacco Use: Medium Risk (3/26/2025)    Patient History     Smoking Tobacco Use: Never     Smokeless Tobacco Use: Never     Passive Exposure: Current   Alcohol Use: Not At Risk (7/22/2024)    AUDIT-C     Frequency of Alcohol Consumption: Never     Average Number of Drinks: Patient does not drink     Frequency of Binge Drinking: Never   Financial Resource Strain: Not on file   Food Insecurity: Not on file   Transportation Needs: Not on file   Physical Activity: Not on file   Stress: Not on file   Social Connections: Not on file   Intimate Partner Violence: Not on file   Depression: Not at risk (3/26/2025)    PHQ-2     PHQ-2 Score: 0   Housing Stability: Not on file   Utilities: Not on file   Digital Equity: Not on file   Health Literacy: Not on file        FH:  Family History   Problem Relation Name Age of Onset    Diabetes Mother Karen     Heart disease Mother Karen     Hypertension Mother Karen     No Known Problems Father      Diabetes Brother Samm     No Known Problems Brother      No Known Problems Brother      No Known Problems Daughter      No Known Problems Son      No Known Problems Son      No Known Problems Son      Diabetes Maternal Grandmother Abiola     Diabetes Maternal Grandfather Bill     Heart disease Maternal Grandfather Bill     Hypertension Maternal Grandfather Bill         ROS:  Review of Systems   A complete, 10-point review of systems was completed and negative except as noted above.     Objective:  Vitals:  Vitals:    03/26/25 1455   BP: 139/80   Pulse: 63        Exam:  Constitutional: no acute distress  Neuro: awake, alert, oriented; no gross deficits noted   HEENT: No deformities, no scleral icterus   Cardiac: regular rate  Pulmonary: unlabored respirations on room air  Abdomen: soft, non-tender,  non-distended  Skin: warm and dry; wounds: none  Extremities: no peripheral edema noted. Left forearm fistula with two aneurysmal portions where he is accessed regularly. There is an excellent thrill in the fistula up to the AC fossa.   MSK: motor and sensory intact in all extremities  Vascular:   Radial: R: palpable. L: thrill.   Ulnar: L: palpable.     Imaging:  None relevant     Assessment & Plan:  Abner Fabian is 54 y.o. male with history of ESRD on HD MWF via left radiocephalic fistula who presents with aneurysmal fistula but no skin breakdown or issues with dialysis. It is not bothersome to the patient. Aneurysmal changes likely due to repeated access trauma and not central venous occlusive process.    - no indication for intervention at this time  - access sites should be rotated and cannulation of the aneurysmal portions should be avoided so as to not further weaken the skin/fistula and cause further aneurysmal degeneration  - counseled re: return precautions  - follow up with vascular surgery PRN    Seen with Dr. Luba Escobar MD, PhD  Vascular Surgery, PGY3

## 2025-04-01 ENCOUNTER — APPOINTMENT (OUTPATIENT)
Dept: VASCULAR SURGERY | Facility: CLINIC | Age: 55
End: 2025-04-01
Payer: MEDICARE

## 2025-04-03 ENCOUNTER — OFFICE VISIT (OUTPATIENT)
Dept: URGENT CARE | Age: 55
End: 2025-04-03
Payer: MEDICARE

## 2025-04-03 VITALS
SYSTOLIC BLOOD PRESSURE: 173 MMHG | OXYGEN SATURATION: 99 % | HEART RATE: 68 BPM | RESPIRATION RATE: 18 BRPM | BODY MASS INDEX: 25.18 KG/M2 | TEMPERATURE: 98.1 F | HEIGHT: 73 IN | DIASTOLIC BLOOD PRESSURE: 98 MMHG | WEIGHT: 190 LBS

## 2025-04-03 DIAGNOSIS — L97.511 DIABETIC ULCER OF RIGHT FOOT ASSOCIATED WITH DIABETES MELLITUS DUE TO UNDERLYING CONDITION, LIMITED TO BREAKDOWN OF SKIN, UNSPECIFIED PART OF FOOT: Primary | ICD-10-CM

## 2025-04-03 DIAGNOSIS — I10 ESSENTIAL HYPERTENSION: ICD-10-CM

## 2025-04-03 DIAGNOSIS — E08.621 DIABETIC ULCER OF RIGHT FOOT ASSOCIATED WITH DIABETES MELLITUS DUE TO UNDERLYING CONDITION, LIMITED TO BREAKDOWN OF SKIN, UNSPECIFIED PART OF FOOT: Primary | ICD-10-CM

## 2025-04-03 DIAGNOSIS — E11.41 DIABETIC MONONEUROPATHY ASSOCIATED WITH TYPE 2 DIABETES MELLITUS: ICD-10-CM

## 2025-04-03 PROCEDURE — 3008F BODY MASS INDEX DOCD: CPT | Performed by: SURGERY

## 2025-04-03 PROCEDURE — 3077F SYST BP >= 140 MM HG: CPT | Performed by: SURGERY

## 2025-04-03 PROCEDURE — 1036F TOBACCO NON-USER: CPT | Performed by: SURGERY

## 2025-04-03 PROCEDURE — 4010F ACE/ARB THERAPY RXD/TAKEN: CPT | Performed by: SURGERY

## 2025-04-03 PROCEDURE — 99204 OFFICE O/P NEW MOD 45 MIN: CPT | Performed by: SURGERY

## 2025-04-03 PROCEDURE — 3080F DIAST BP >= 90 MM HG: CPT | Performed by: SURGERY

## 2025-04-03 RX ORDER — DOXYCYCLINE 100 MG/1
100 CAPSULE ORAL 2 TIMES DAILY
Qty: 20 CAPSULE | Refills: 0 | Status: SHIPPED | OUTPATIENT
Start: 2025-04-03 | End: 2025-04-13

## 2025-04-03 NOTE — PROGRESS NOTES
Chief Complaint   Patient presents with    Foot Problem     Cleaned right foot yesterday night and noticed its sores.       Physical Exam:             Encounter Diagnoses   Name Primary?    Diabetic ulcer of right foot associated with diabetes mellitus due to underlying condition, limited to breakdown of skin, unspecified part of foot Yes    Diabetic mononeuropathy associated with type 2 diabetes mellitus     Essential hypertension         Medical Decision Making & Plan:   Management complicated by uncontrolled DM (HgA1c 8.7), DM neuropathy, Renal insufficiency and infection of the foot     Doxycycline  Referral to DPM    Home        04/03/25 at 9:42 AM - Hailee Schuler DO

## 2025-04-15 ENCOUNTER — APPOINTMENT (OUTPATIENT)
Dept: GASTROENTEROLOGY | Facility: HOSPITAL | Age: 55
End: 2025-04-15
Payer: MEDICARE

## 2025-06-30 ENCOUNTER — TELEPHONE (OUTPATIENT)
Facility: CLINIC | Age: 55
End: 2025-06-30
Payer: MEDICARE

## 2025-06-30 NOTE — TELEPHONE ENCOUNTER
Calling patient to inform him he needs a visit with dr giancarlo aguila --last visit was 5/2024-patient called back to inform us he recently had a kidney transplant and will be following up with his [facility] team for the next 6 months--so he will get refills for his jyoti sensors through that facility and will contact us in future--dr aguila aware

## 2025-08-05 DIAGNOSIS — E78.00 HYPERCHOLESTEROLEMIA: ICD-10-CM

## 2025-08-06 RX ORDER — ATORVASTATIN CALCIUM 10 MG/1
10 TABLET, FILM COATED ORAL DAILY
Qty: 30 TABLET | Refills: 5 | Status: SHIPPED | OUTPATIENT
Start: 2025-08-06